# Patient Record
Sex: FEMALE | Race: BLACK OR AFRICAN AMERICAN | NOT HISPANIC OR LATINO | URBAN - METROPOLITAN AREA
[De-identification: names, ages, dates, MRNs, and addresses within clinical notes are randomized per-mention and may not be internally consistent; named-entity substitution may affect disease eponyms.]

---

## 2017-12-22 ENCOUNTER — OUTPATIENT (OUTPATIENT)
Dept: OUTPATIENT SERVICES | Facility: HOSPITAL | Age: 67
LOS: 1 days | Discharge: HOME | End: 2017-12-22

## 2017-12-22 DIAGNOSIS — Z02.9 ENCOUNTER FOR ADMINISTRATIVE EXAMINATIONS, UNSPECIFIED: ICD-10-CM

## 2019-05-15 ENCOUNTER — OUTPATIENT (OUTPATIENT)
Dept: OUTPATIENT SERVICES | Facility: HOSPITAL | Age: 69
LOS: 1 days | Discharge: HOME | End: 2019-05-15

## 2019-05-15 DIAGNOSIS — E04.9 NONTOXIC GOITER, UNSPECIFIED: ICD-10-CM

## 2019-10-18 PROBLEM — Z00.00 ENCOUNTER FOR PREVENTIVE HEALTH EXAMINATION: Status: ACTIVE | Noted: 2019-10-18

## 2019-10-31 ENCOUNTER — APPOINTMENT (OUTPATIENT)
Dept: SURGERY | Facility: CLINIC | Age: 69
End: 2019-10-31
Payer: MEDICARE

## 2019-10-31 VITALS
HEIGHT: 67 IN | BODY MASS INDEX: 28.72 KG/M2 | WEIGHT: 183 LBS | DIASTOLIC BLOOD PRESSURE: 78 MMHG | SYSTOLIC BLOOD PRESSURE: 126 MMHG

## 2019-10-31 DIAGNOSIS — L72.0 EPIDERMAL CYST: ICD-10-CM

## 2019-10-31 DIAGNOSIS — Z78.9 OTHER SPECIFIED HEALTH STATUS: ICD-10-CM

## 2019-10-31 DIAGNOSIS — Z80.3 FAMILY HISTORY OF MALIGNANT NEOPLASM OF BREAST: ICD-10-CM

## 2019-10-31 DIAGNOSIS — K57.30 DIVERTICULOSIS OF LARGE INTESTINE W/OUT PERFORATION OR ABSCESS W/OUT BLEEDING: ICD-10-CM

## 2019-10-31 DIAGNOSIS — J32.9 CHRONIC SINUSITIS, UNSPECIFIED: ICD-10-CM

## 2019-10-31 DIAGNOSIS — S46.009A UNSPECIFIED INJURY OF MUSCLE(S) AND TENDON(S) OF THE ROTATOR CUFF OF UNSPECIFIED SHOULDER, INITIAL ENCOUNTER: ICD-10-CM

## 2019-10-31 DIAGNOSIS — J45.909 UNSPECIFIED ASTHMA, UNCOMPLICATED: ICD-10-CM

## 2019-10-31 DIAGNOSIS — Z82.49 FAMILY HISTORY OF ISCHEMIC HEART DISEASE AND OTHER DISEASES OF THE CIRCULATORY SYSTEM: ICD-10-CM

## 2019-10-31 DIAGNOSIS — Z82.3 FAMILY HISTORY OF STROKE: ICD-10-CM

## 2019-10-31 PROCEDURE — 99202 OFFICE O/P NEW SF 15 MIN: CPT

## 2019-11-01 PROBLEM — S46.009A ROTATOR CUFF INJURY: Status: ACTIVE | Noted: 2019-10-31

## 2019-11-01 PROBLEM — J32.9 CHRONIC SINUSITIS: Status: ACTIVE | Noted: 2019-10-31

## 2019-11-01 PROBLEM — Z80.3 FAMILY HISTORY OF MALIGNANT NEOPLASM OF BREAST: Status: ACTIVE | Noted: 2019-10-31

## 2019-11-01 PROBLEM — L72.0 EPIDERMAL INCLUSION CYST: Status: ACTIVE | Noted: 2019-11-01

## 2019-11-01 PROBLEM — Z82.3 FAMILY HISTORY OF CEREBROVASCULAR ACCIDENT (CVA): Status: ACTIVE | Noted: 2019-10-31

## 2019-11-01 PROBLEM — Z82.49 FAMILY HISTORY OF MYOCARDIAL INFARCTION: Status: ACTIVE | Noted: 2019-10-31

## 2019-11-01 PROBLEM — Z78.9 CAFFEINE USE: Status: ACTIVE | Noted: 2019-10-31

## 2019-11-01 PROBLEM — Z78.9 CONSUMES ALCOHOL OCCASIONALLY: Status: ACTIVE | Noted: 2019-10-31

## 2019-11-01 PROBLEM — K57.30 DIVERTICULOSIS, SIGMOID: Status: ACTIVE | Noted: 2019-10-31

## 2019-11-01 PROBLEM — J45.909 ASTHMA: Status: ACTIVE | Noted: 2019-10-31

## 2019-11-01 NOTE — ASSESSMENT
[FreeTextEntry1] : 3 subcutaneous soft tissue tumors of the right upper arm, consistent with lipomas. The 2 that are symptomatic will be excised for definitive diagnosis and the procedure was explained in full.  The patient and her daughter understand and agree and are ready to proceed as outlined. All their questions were answered.

## 2019-11-01 NOTE — REASON FOR VISIT
[Initial Evaluation] : an initial evaluation [Family Member] : family member [FreeTextEntry1] : Multiple masses right upper arm

## 2019-11-01 NOTE — PHYSICAL EXAM
[Normal Thyroid] : the thyroid was normal [Normal Breath Sounds] : Normal breath sounds [Normal Heart Sounds] : normal heart sounds [Normal Rate and Rhythm] : normal rate and rhythm [de-identified] : Noninflamed inclusion cysts less than 1 cm in size in the right parietal occipital region of the scalp [de-identified] : no adenopathy [de-identified] : 3 subcutaneous soft tissue masses of the right upper arm, 2 measuring 5 and 12 mm respectively located laterally just above the right elbow, which are tender to the touch but with no local acute changes. A third measures about 1 cm in the inner aspect of the right upper arm and is nontender.  No axillary adenopathy.

## 2020-05-01 ENCOUNTER — TRANSCRIPTION ENCOUNTER (OUTPATIENT)
Age: 70
End: 2020-05-01

## 2020-05-01 ENCOUNTER — EMERGENCY (EMERGENCY)
Facility: HOSPITAL | Age: 70
LOS: 0 days | Discharge: HOME | End: 2020-05-01
Attending: EMERGENCY MEDICINE | Admitting: EMERGENCY MEDICINE
Payer: MEDICARE

## 2020-05-01 VITALS
SYSTOLIC BLOOD PRESSURE: 172 MMHG | HEART RATE: 107 BPM | RESPIRATION RATE: 18 BRPM | DIASTOLIC BLOOD PRESSURE: 88 MMHG | OXYGEN SATURATION: 99 % | TEMPERATURE: 98 F

## 2020-05-01 DIAGNOSIS — M79.603 PAIN IN ARM, UNSPECIFIED: ICD-10-CM

## 2020-05-01 DIAGNOSIS — G72.9 MYOPATHY, UNSPECIFIED: ICD-10-CM

## 2020-05-01 LAB
ALBUMIN SERPL ELPH-MCNC: 5 G/DL — SIGNIFICANT CHANGE UP (ref 3.5–5.2)
ALP SERPL-CCNC: 128 U/L — HIGH (ref 30–115)
ALT FLD-CCNC: 17 U/L — SIGNIFICANT CHANGE UP (ref 0–41)
ANION GAP SERPL CALC-SCNC: 13 MMOL/L — SIGNIFICANT CHANGE UP (ref 7–14)
APPEARANCE UR: CLEAR — SIGNIFICANT CHANGE UP
AST SERPL-CCNC: 26 U/L — SIGNIFICANT CHANGE UP (ref 0–41)
BILIRUB SERPL-MCNC: 0.7 MG/DL — SIGNIFICANT CHANGE UP (ref 0.2–1.2)
BILIRUB UR-MCNC: NEGATIVE — SIGNIFICANT CHANGE UP
BUN SERPL-MCNC: 21 MG/DL — HIGH (ref 10–20)
CALCIUM SERPL-MCNC: 10 MG/DL — SIGNIFICANT CHANGE UP (ref 8.5–10.1)
CHLORIDE SERPL-SCNC: 101 MMOL/L — SIGNIFICANT CHANGE UP (ref 98–110)
CK SERPL-CCNC: 171 U/L — SIGNIFICANT CHANGE UP (ref 0–225)
CO2 SERPL-SCNC: 27 MMOL/L — SIGNIFICANT CHANGE UP (ref 17–32)
COLOR SPEC: SIGNIFICANT CHANGE UP
CREAT SERPL-MCNC: 1.4 MG/DL — SIGNIFICANT CHANGE UP (ref 0.7–1.5)
DIFF PNL FLD: NEGATIVE — SIGNIFICANT CHANGE UP
GLUCOSE SERPL-MCNC: 97 MG/DL — SIGNIFICANT CHANGE UP (ref 70–99)
GLUCOSE UR QL: NEGATIVE — SIGNIFICANT CHANGE UP
HCT VFR BLD CALC: 43.2 % — SIGNIFICANT CHANGE UP (ref 37–47)
HGB BLD-MCNC: 13.9 G/DL — SIGNIFICANT CHANGE UP (ref 12–16)
KETONES UR-MCNC: NEGATIVE — SIGNIFICANT CHANGE UP
LEUKOCYTE ESTERASE UR-ACNC: NEGATIVE — SIGNIFICANT CHANGE UP
MCHC RBC-ENTMCNC: 26.3 PG — LOW (ref 27–31)
MCHC RBC-ENTMCNC: 32.2 G/DL — SIGNIFICANT CHANGE UP (ref 32–37)
MCV RBC AUTO: 81.8 FL — SIGNIFICANT CHANGE UP (ref 81–99)
NITRITE UR-MCNC: NEGATIVE — SIGNIFICANT CHANGE UP
NRBC # BLD: 0 /100 WBCS — SIGNIFICANT CHANGE UP (ref 0–0)
PH UR: 6.5 — SIGNIFICANT CHANGE UP (ref 5–8)
PLATELET # BLD AUTO: 227 K/UL — SIGNIFICANT CHANGE UP (ref 130–400)
POTASSIUM SERPL-MCNC: 4.4 MMOL/L — SIGNIFICANT CHANGE UP (ref 3.5–5)
POTASSIUM SERPL-SCNC: 4.4 MMOL/L — SIGNIFICANT CHANGE UP (ref 3.5–5)
PROT SERPL-MCNC: 7.8 G/DL — SIGNIFICANT CHANGE UP (ref 6–8)
PROT UR-MCNC: NEGATIVE — SIGNIFICANT CHANGE UP
RBC # BLD: 5.28 M/UL — SIGNIFICANT CHANGE UP (ref 4.2–5.4)
RBC # FLD: 14.2 % — SIGNIFICANT CHANGE UP (ref 11.5–14.5)
SODIUM SERPL-SCNC: 141 MMOL/L — SIGNIFICANT CHANGE UP (ref 135–146)
SP GR SPEC: 1.01 — LOW (ref 1.01–1.02)
TROPONIN T SERPL-MCNC: <0.01 NG/ML — SIGNIFICANT CHANGE UP
UROBILINOGEN FLD QL: SIGNIFICANT CHANGE UP
WBC # BLD: 5.45 K/UL — SIGNIFICANT CHANGE UP (ref 4.8–10.8)
WBC # FLD AUTO: 5.45 K/UL — SIGNIFICANT CHANGE UP (ref 4.8–10.8)

## 2020-05-01 PROCEDURE — 71045 X-RAY EXAM CHEST 1 VIEW: CPT | Mod: 26

## 2020-05-01 PROCEDURE — 93010 ELECTROCARDIOGRAM REPORT: CPT

## 2020-05-01 PROCEDURE — 99285 EMERGENCY DEPT VISIT HI MDM: CPT

## 2020-05-01 NOTE — ED PROVIDER NOTE - CARE PROVIDERS DIRECT ADDRESSES
,[email or web address] [address].\[facility] [place] [facility]\""irect.[facility]

## 2020-05-01 NOTE — ED ADULT NURSE NOTE - OBJECTIVE STATEMENT
pt is A&Ox3, ambulatory, able to make needs known and presents with C/O right arm pain that was sudden and sharp at home. Pt went to [facility] and was then sent to ED for abnormal EKG and HTN. Pt states she started taking Lipitor and takes water pills and is unsure if they are related to her symptoms.

## 2020-05-01 NOTE — ED PROVIDER NOTE - NSFOLLOWUPINSTRUCTIONS_ED_ALL_ED_FT
Myopathy  Myopathy is a condition that causes muscles to be weak and not work well. There are many different kinds of myopathies. Myopathies may be passed from parent to child (inherited), or they may be caused by external factors (acquired).  Inherited myopathies may cause symptoms at birth or early in life. Acquired myopathies may start suddenly at any age. There is no cure for most myopathies.  What are the causes?  Common causes of myopathy include:  Endocrine disorders, such as thyroid disease.Metabolic disorders, which are usually inherited.Infection or inflammation of the muscles. This is often triggered by viruses or because the body's defense system (immune system) is attacking the muscles.Certain medicines, such as lipid-lowering medicines.In some cases, the cause is not known.  What increases the risk?  You are more likely to develop this condition if you:  Have a family history of myopathy.Are female.What are the signs or symptoms?  Symptoms of myopathy can range from mild to severe. Common symptoms of this condition include:  Muscle weakness.Cramps.Stiffness.Spasms.These symptoms are usually felt close to the center of the body (proximal). Depending upon the type of myopathy, one muscle group may be more affected than others. In inherited myopathies, symptoms vary among family members.  Other symptoms of myopathy include:  Muscle pain or tenderness.Muscle weakness that gets progressively worse.Fatigue.Heart problems.Trouble breathing.Trouble swallowing.How is this diagnosed?  This condition is diagnosed based on your medical history and tests, which may include:  Blood tests.Removal of a small piece of muscle tissue to be tested (biopsy).Electromyogram (EMG).MRI.Electrocardiogram (ECG).Genetic testing.How is this treated?  Treatment for this condition depends on the type of myopathy. Treatment may include:  Over-the-counter medicines such as acetaminophen or ibuprofen.Prescription medicines, such as disease-modifying antirheumatic drugs (DMARDs) or drugs that suppress the immune system.Physical therapy.A brace to help stabilize your muscles.Follow these instructions at home:  If you have a brace:     Wear the brace as told by your health care provider. Remove it only as told by your health care provider.Loosen the brace if any part of your body tingles, becomes numb, or turns cold and blue.Keep the brace clean.Check the skin around it every day. Tell your health care provider about any concerns.If the brace is not waterproof:  Do not let it get wet.Cover it with a watertight covering when you take a bath or shower.Ask your health care provider when it is safe to drive if you are wearing a brace.General instructions     Take over-the-counter and prescription medicines only as told by your health care provider.Maintain a healthy weight. Follow instructions from your health care provider about eating, drinking, and physical activity.If physical therapy is prescribed, do exercises as told by your health care provider or physical therapist.Keep all follow-up visits as told by your health care provider. This is important.Contact a health care provider if you have:  Trouble managing your symptoms at home.A fever.Get help right away if you develop:  Breathing problems.Chest pain.Summary  Myopathy is a condition that causes muscles to be weak and not work well.There is no cure for most myopathies.This condition may be treated with medicines, physical therapy, and a brace to help stabilize your muscles.This information is not intended to replace advice given to you by your health care provider. Make sure you discuss any questions you have with your health care provider.

## 2020-05-01 NOTE — ED ADULT NURSE NOTE - CHIEF COMPLAINT QUOTE
C/o severe right arm pain. Sent in by [facility] for abnormal EKG. Pt received 3 baby aspirin at [facility].

## 2020-05-01 NOTE — ED PROVIDER NOTE - CLINICAL SUMMARY MEDICAL DECISION MAKING FREE TEXT BOX
70 yo woman with sudden onset of sharp and severe right arm pain at the bicep only associated with some numbness to right hand.  ? radiculopathy vs. myopathy.  Cardiac workup negative in addition, story does not sound cardiac in nature.  Stable for discharge and outpatient follow up.  Return for any new or worsening symptoms.

## 2020-05-01 NOTE — ED PROVIDER NOTE - CARE PROVIDER_API CALL
Sarkis Palmer)  Internal Medicine  [address] [place] [address]  Phone: [phone]  Fax: [phone]  Follow Up Time: 4-6 Days

## 2020-05-01 NOTE — ED PROVIDER NOTE - PROGRESS NOTE DETAILS
Pt in no distress, resting comfortably. Pending remaining labs. Discussed labs and imaging. Discussed need to follow up PMD Dr. Brown. Discussed signs and symptoms to return to the ED for. Pt understands and agrees with discharge plan

## 2020-05-01 NOTE — ED PROVIDER NOTE - ATTENDING CONTRIBUTION TO CARE
I personally evaluated the patient. I reviewed the Resident’s or Physician Assistant’s note (as assigned above), and agree with the findings and plan except as documented in my note.  68 yo woman with severe sudden right arm pain today that lasted about one minute and resolved.   Associated with some numbness to right hand.  EKG and labs ok.   CXRAY ok.  Story does not sound like cardiac ischemia.  Likely radicular or myopathy.  Patient very concerned about uncontrolled BP.  She will keep a log and follow up with PMD next week.  Stable for discharge.

## 2020-05-01 NOTE — ED PROVIDER NOTE - OBJECTIVE STATEMENT
68 yo F with hx of HTN, HDL, recently started on atorvastatin, presents for evaluation of right arm pain, onset today, resolved. Pt also reports that she has been having muscle pain/spasms since she started atorvastatin a week ago. No fever, no chill, no headache, no nausea, no vomiting, no chest pain, no back pain, no abdominal pain, no SOB. Pt states she went to the urgent care, was given 3 81mg ASA, and had an ekg that was "abnormal" and was recommended to come to the ED.   PMD: Stephanie 70 yo F with hx of HTN, HDL, recently started on atorvastatin, presents for evaluation of right arm pain, onset today, resolved. Pt also reports that she has been having muscle pain/spasms since she started atorvastatin a week ago. No fever, no chill, no headache, no nausea, no vomiting, no chest pain, no back pain, no abdominal pain, no SOB. Pt states she went to the urgent care, was given 3 81mg ASA, and had an ekg that was "abnormal" and was recommended to come to the ED.   PMD: Estela

## 2020-05-01 NOTE — ED ADULT TRIAGE NOTE - CHIEF COMPLAINT QUOTE
C/o severe right arm pain. [place] [facility] [place]. Pt received 3 baby aspirin at [facility].

## 2020-05-01 NOTE — ED PROVIDER NOTE - PATIENT PORTAL LINK FT
You can access the FollowMyHealth Patient Portal offered by [facility] by registering at the following website: [email or web address]. By joining [employer]’s FollowMyHealth portal, you will also be able to view your health information using other applications (apps) compatible with our system.

## 2020-06-15 ENCOUNTER — APPOINTMENT (OUTPATIENT)
Dept: OBGYN | Facility: CLINIC | Age: 70
End: 2020-06-15

## 2020-07-31 ENCOUNTER — APPOINTMENT (OUTPATIENT)
Dept: OBGYN | Facility: CLINIC | Age: 70
End: 2020-07-31
Payer: MEDICARE

## 2020-07-31 VITALS
HEART RATE: 85 BPM | TEMPERATURE: 98.7 F | BODY MASS INDEX: 27 KG/M2 | HEIGHT: 67 IN | SYSTOLIC BLOOD PRESSURE: 150 MMHG | DIASTOLIC BLOOD PRESSURE: 100 MMHG | WEIGHT: 172 LBS

## 2020-07-31 DIAGNOSIS — Z83.3 FAMILY HISTORY OF DIABETES MELLITUS: ICD-10-CM

## 2020-07-31 DIAGNOSIS — Z87.09 PERSONAL HISTORY OF OTHER DISEASES OF THE RESPIRATORY SYSTEM: ICD-10-CM

## 2020-07-31 DIAGNOSIS — Z86.69 PERSONAL HISTORY OF OTHER DISEASES OF THE NERVOUS SYSTEM AND SENSE ORGANS: ICD-10-CM

## 2020-07-31 DIAGNOSIS — K57.90 DIVERTICULOSIS OF INTESTINE, PART UNSPECIFIED, W/OUT PERFORATION OR ABSCESS W/OUT BLEEDING: ICD-10-CM

## 2020-07-31 DIAGNOSIS — Z63.5 DISRUPTION OF FAMILY BY SEPARATION AND DIVORCE: ICD-10-CM

## 2020-07-31 DIAGNOSIS — Z80.41 FAMILY HISTORY OF MALIGNANT NEOPLASM OF OVARY: ICD-10-CM

## 2020-07-31 DIAGNOSIS — I73.00 RAYNAUD'S SYNDROME W/OUT GANGRENE: ICD-10-CM

## 2020-07-31 DIAGNOSIS — Z86.19 PERSONAL HISTORY OF OTHER INFECTIOUS AND PARASITIC DISEASES: ICD-10-CM

## 2020-07-31 DIAGNOSIS — M72.2 PLANTAR FASCIAL FIBROMATOSIS: ICD-10-CM

## 2020-07-31 LAB
BILIRUB UR QL STRIP: NORMAL
CLARITY UR: CLEAR
COLLECTION METHOD: NORMAL
GLUCOSE UR-MCNC: NORMAL
HCG UR QL: 0.2 EU/DL
HGB UR QL STRIP.AUTO: ABNORMAL
KETONES UR-MCNC: NORMAL
LEUKOCYTE ESTERASE UR QL STRIP: NORMAL
NITRITE UR QL STRIP: NORMAL
PH UR STRIP: 7
PROT UR STRIP-MCNC: NORMAL
SP GR UR STRIP: 1.02

## 2020-07-31 PROCEDURE — 99215 OFFICE O/P EST HI 40 MIN: CPT

## 2020-07-31 PROCEDURE — 81003 URINALYSIS AUTO W/O SCOPE: CPT | Mod: QW

## 2020-07-31 PROCEDURE — 99387 INIT PM E/M NEW PAT 65+ YRS: CPT

## 2020-07-31 SDOH — SOCIAL STABILITY - SOCIAL INSECURITY: DISRUPTION OF FAMILY BY SEPARATION AND DIVORCE: Z63.5

## 2020-08-04 PROBLEM — Z80.41 FAMILY HISTORY OF MALIGNANT NEOPLASM OF OVARY: Status: ACTIVE | Noted: 2020-08-04

## 2020-08-04 PROBLEM — K57.90 DIVERTICULOSIS: Status: RESOLVED | Noted: 2020-08-04 | Resolved: 2020-08-04

## 2020-08-04 PROBLEM — Z63.5 DIVORCED: Status: ACTIVE | Noted: 2020-07-31

## 2020-08-04 PROBLEM — Z83.3 FAMILY HISTORY OF DIABETES MELLITUS: Status: ACTIVE | Noted: 2020-08-04

## 2020-08-04 PROBLEM — Z86.19 HISTORY OF HEPATITIS B VIRUS INFECTION: Status: RESOLVED | Noted: 2020-08-04 | Resolved: 2020-08-04

## 2020-08-04 PROBLEM — I73.00 RAYNAUD PHENOMENON: Status: RESOLVED | Noted: 2020-08-04 | Resolved: 2020-08-04

## 2020-08-04 PROBLEM — Z87.09 HISTORY OF ASTHMA: Status: RESOLVED | Noted: 2020-08-04 | Resolved: 2020-08-04

## 2020-08-04 PROBLEM — Z86.69 HISTORY OF SCIATICA: Status: RESOLVED | Noted: 2020-08-04 | Resolved: 2020-08-04

## 2020-08-04 PROBLEM — M72.2 PLANTAR FASCIITIS: Status: ACTIVE | Noted: 2019-10-31

## 2020-08-04 LAB — HPV HIGH+LOW RISK DNA PNL CVX: NOT DETECTED

## 2020-08-04 RX ORDER — VALSARTAN 160 MG/1
160 TABLET, COATED ORAL
Refills: 0 | Status: COMPLETED | COMMUNITY
End: 2020-08-04

## 2020-08-04 RX ORDER — TRIAMTERENE AND HYDROCHLOROTHIAZIDE 25; 37.5 MG/1; MG/1
37.5-25 TABLET ORAL
Refills: 0 | Status: COMPLETED | COMMUNITY
End: 2020-08-04

## 2020-08-04 NOTE — CHIEF COMPLAINT
[Initial Visit] : initial GYN visit [FreeTextEntry1] : Pt is here for her initial annual exam. Overall pt feels good, denies any pelvic pain or abnormal bleeding. Pt c/o had small lump on labia that comes and goes every month. \par \par Mammo- 2019 abnormal cyst was aspirated and was benign. \par 2020- results pending.

## 2020-08-04 NOTE — HISTORY OF PRESENT ILLNESS
[Definite:  ___ (Date)] : the last menstrual period was [unfilled] [Menarche Age: ____] : age at menarche was [unfilled] [Menopause Age: ____] : age at menopause was [unfilled] [No] : no [Currently In Menopause] : currently in menopause [Experiencing Menopausal Sxs] : not experiencing menopausal symptoms [Sexually Active] : is not sexually active [Contraception] : does not use contraception

## 2020-08-04 NOTE — COUNSELING
[Nutrition] : nutrition [Exercise] : exercise [Breast Self Exam] : breast self exam [Vitamins/Supplements] : vitamins/supplements [Safe Sexual Practices] : safe sexual practices

## 2020-08-04 NOTE — PHYSICAL EXAM
[Awake] : awake [Alert] : alert [Acute Distress] : no acute distress [Mass] : no breast mass [Soft] : soft [Axillary LAD] : no axillary lymphadenopathy [Nipple Discharge] : no nipple discharge [Distended] : not distended [Oriented x3] : oriented to person, place, and time [Tender] : non tender [Depressed Mood] : not depressed [No Lesions] : no genitalia lesions [Normal] : vagina [Labia Majora] : labia major [Labia Minora] : labia minora [Atrophy] : atrophy [Pink Rugae] : pink rugae [No Bleeding] : there was no active vaginal bleeding [Discharge] : no discharge [Polyp ___ cm] : had a [unfilled] ~Ucm polyp [Motion Tenderness] : there was no cervical motion tenderness [Pap Obtained] : a Pap smear was performed [Uterine Adnexae] : were not tender and not enlarged [Enlarged ___ wks] : not enlarged [Tenderness] : nontender [Mass ___ cm] : no uterine mass was palpated [FreeTextEntry5] : cervical polyp? lesion noted within the cervical canal and minimal protrusion, difficult to visualize if coming from cervix or uterus, can clear margin circumferentially all areas of visualization [de-identified] : area and lesion patient c/o intermittent is not visible or palpable on today's exam, encouraged to come when present for evaluation [FreeTextEntry4] : left vaginal wall cyst, 1cm, near fornix, NT

## 2020-08-13 ENCOUNTER — EMERGENCY (EMERGENCY)
Facility: HOSPITAL | Age: 70
LOS: 0 days | Discharge: HOME | End: 2020-08-13
Attending: EMERGENCY MEDICINE | Admitting: EMERGENCY MEDICINE
Payer: MEDICARE

## 2020-08-13 VITALS
DIASTOLIC BLOOD PRESSURE: 95 MMHG | SYSTOLIC BLOOD PRESSURE: 181 MMHG | TEMPERATURE: 98 F | WEIGHT: 177.91 LBS | RESPIRATION RATE: 18 BRPM | HEART RATE: 97 BPM | OXYGEN SATURATION: 100 %

## 2020-08-13 DIAGNOSIS — R60.0 LOCALIZED EDEMA: ICD-10-CM

## 2020-08-13 DIAGNOSIS — R35.0 FREQUENCY OF MICTURITION: ICD-10-CM

## 2020-08-13 DIAGNOSIS — Z88.2 ALLERGY STATUS TO SULFONAMIDES: ICD-10-CM

## 2020-08-13 DIAGNOSIS — I10 ESSENTIAL (PRIMARY) HYPERTENSION: ICD-10-CM

## 2020-08-13 DIAGNOSIS — E78.5 HYPERLIPIDEMIA, UNSPECIFIED: ICD-10-CM

## 2020-08-13 DIAGNOSIS — Z88.0 ALLERGY STATUS TO PENICILLIN: ICD-10-CM

## 2020-08-13 DIAGNOSIS — Z88.8 ALLERGY STATUS TO OTHER DRUGS, MEDICAMENTS AND BIOLOGICAL SUBSTANCES STATUS: ICD-10-CM

## 2020-08-13 LAB
APPEARANCE UR: CLEAR — SIGNIFICANT CHANGE UP
BILIRUB UR-MCNC: NEGATIVE — SIGNIFICANT CHANGE UP
COLOR SPEC: COLORLESS — SIGNIFICANT CHANGE UP
DIFF PNL FLD: NEGATIVE — SIGNIFICANT CHANGE UP
GLUCOSE UR QL: NEGATIVE — SIGNIFICANT CHANGE UP
KETONES UR-MCNC: NEGATIVE — SIGNIFICANT CHANGE UP
LEUKOCYTE ESTERASE UR-ACNC: NEGATIVE — SIGNIFICANT CHANGE UP
NITRITE UR-MCNC: NEGATIVE — SIGNIFICANT CHANGE UP
PH UR: 7.5 — SIGNIFICANT CHANGE UP (ref 5–8)
PROT UR-MCNC: NEGATIVE — SIGNIFICANT CHANGE UP
SP GR SPEC: 1.01 — LOW (ref 1.01–1.02)
UROBILINOGEN FLD QL: SIGNIFICANT CHANGE UP

## 2020-08-13 PROCEDURE — 99283 EMERGENCY DEPT VISIT LOW MDM: CPT

## 2020-08-13 NOTE — ED ADULT NURSE NOTE - OBJECTIVE STATEMENT
Patient present to ED with complains of b/l lower ext swelling x 10 days, started on a new BP medication. Denies fevers, chills, nausea, vomiting, diarrhea, lip swelling, and SOB.

## 2020-08-13 NOTE — ED PROVIDER NOTE - CARE PROVIDER_API CALL
Tapan Licea  [address] [address]  Phone: [phone]  Fax: [phone]  Follow Up Time: 1-3 Days

## 2020-08-13 NOTE — ED PROVIDER NOTE - PATIENT PORTAL LINK FT
You can access the FollowMyHealth Patient Portal offered by [facility] by registering at the following website: [email or web address]. By joining [employer]’s FollowMyHealth portal, you will also be able to view your health information using other applications (apps) compatible with our system.

## 2020-08-13 NOTE — ED PROVIDER NOTE - PHYSICAL EXAMINATION
Physical Exam    Vital Signs: I have reviewed the initial vital signs.  Constitutional: well-nourished, appears stated age, no acute distress  Cardiovascular: S1 and S2, regular rate, regular rhythm, well-perfused extremities, radial and pedal pulses equal and 2+ b/l.   Respiratory: unlabored respiratory effort, clear to auscultation bilaterally no wheezing, rales and rhonchi. pt is speaking full sentences. no accessory muscle use.   Gastrointestinal: soft, non-tender, nondistended abdomen, no pulsatile mass, normal bowl sounds, no rebound, no guarding  Musculoskeletal: supple neck, mild b/l ankle edema, no calf tenderness, no midline tenderness, no palpable spinal step offs. FROM of b/l upper and lower extremities.   Integumentary: warm, dry, no rash.   Neurologic: awake, alert  Psychiatric: appropriate mood, appropriate affect

## 2020-08-13 NOTE — ED PROVIDER NOTE - ATTENDING CONTRIBUTION TO CARE
68 y/o f w/ pmhx of htn and Raynaud's presents with b/l ankle nonpitting edema for two days. pt reports she stands on her legs all day and has been doing so more often lately. pt also reports she was switched from losartan to irbesartan ~ 12 days ago and has been taking it for bp control, today called her pmd and the nurse there told her to come to ed for concern for allergic reaction. pt also requested to check urine because she had an ultrasound on Monday ~ 4 days ago for a polyp and since then noticed increased frequency. No fever, chills, n/v, cp,  pleuritic cp, sob, palpitations, diaphoresis, cough, sore throat, drooling/secretions, hives, pruritis, ha/lh/dizziness, numbness/tingling, neck pain/ stiffness, abd pain, diarrhea, constipation, melena/brbpr, hematuria, burning upon urination, trauma, weakness, edema, calf pain/swelling/erythema, sick contacts, recent travel or rash.    Vital Signs: I have reviewed the initial vital signs. Constitutional: WDWN in nad. Sitting on stretcher speaking full sentences. Integumentary: No rash. ENT: MMM NECK: Supple, non-tender, no meningeal signs. Cardiovascular: RRR, radial pulses 2/4 b/l. No JVD. Respiratory: BS present b/l, ctabl, no wheezing or crackles, no accessory muscle use, no stridor. Gastrointestinal: BS present throughout all 4 quadrants, soft, nd, nt, no rebound tenderness or guarding, no cvat. Musculoskeletal: FROM, b/l ankle non-pitting edema swelling, mild, no calf pain/swelling/erythema. Neurologic: AAOx3, motor 5/5 and sensation intact throughout upper and lowe ext, CN II-XII intact, No facial droop or slurring of speech. No focal deficits.

## 2020-08-13 NOTE — ED PROVIDER NOTE - CLINICAL SUMMARY MEDICAL DECISION MAKING FREE TEXT BOX
pt resting comfortably, no complaints at this time aware of compressions, stockings, elevation of foot, following up with pmd for medication adjustment if needed, no calf pain or swelling, strictly to ankles and minimal, no focal deficits, pt ambulatory in ed, neurovascular intact, urine also negative based on ua, urine culture sent, aware of signs and symptoms to return for, will follow up.

## 2020-08-13 NOTE — ED PROVIDER NOTE - NS ED ROS FT
CONST: No fever, chills or bodyaches  EYES: No pain, redness, drainage or visual changes.  ENT: No ear pain or discharge, nasal discharge or congestion. No sore throat  CARD: No chest pain, palpitations  RESP: No SOB, cough, hemoptysis. No hx of asthma or COPD  GI: No abdominal pain, N/V/D  : No urinary symptoms  MS: No joint pain, back pain or extremity pain/injury  SKIN: (+) ankle swelling. No rashes  NEURO: No headache, dizziness, paresthesias or LOC

## 2020-08-13 NOTE — ED PROVIDER NOTE - PLAN OF CARE
Plan: UA, discussed compression stockings, pt reports will see pmd tomorrow as well to discuss medication she is on. will reassess.

## 2020-08-13 NOTE — ED PROVIDER NOTE - CARE PLAN
Assessment and plan of treatment:	Plan: UA, discussed compression stockings, pt reports will see pmd tomorrow as well to discuss medication she is on. will reassess. Principal Discharge DX:	Ankle swelling  Assessment and plan of treatment:	Plan: UA, discussed compression stockings, pt reports will see pmd tomorrow as well to discuss medication she is on. will reassess.  Secondary Diagnosis:	Urinary frequency

## 2020-08-13 NOTE — ED ADULT TRIAGE NOTE - CHIEF COMPLAINT QUOTE
pt sent to ED by PMD for swelling to B/L hands and feet. PMD thinks it make be an allergic reaction to a new anti-hypertensive medication that she started 12 days ago. denies itching, denies difficulty breathing, denies N/V

## 2020-08-13 NOTE — ED PROVIDER NOTE - PROGRESS NOTE DETAILS
discussed urine negative for uti. discussed with pt f/u with pcp if swelling continues. ice and elevate legs, use compression stockings. advised pt to f/u with vascular as well. pt advised of return precaution such as worsening swelling, sob, redness, or inability to ambulate.

## 2020-08-13 NOTE — ED PROVIDER NOTE - OBJECTIVE STATEMENT
68 y/o female with a PMH of HTN, and HLD presents to the ED for evaluation of b/l ankle swelling x 1 week. Pt used to take valsartan for her bp; but complications with amount she was being given from the pharmacy so pt PCP Dr. Sarmiento changed her medication to ibersartan 13 days ago. pt reports she spoke to medicaid nurse about the swelling who believed she was having an allergic rx to medication, and called her pcp who advised she visit the ED. Pt reports she is 70 y/o female with a PMH of HTN, and HLD presents to the ED for evaluation of b/l ankle swelling x 1 week. Pt used to take valsartan for her bp; but complications with amount she was being given from the pharmacy so pt PCP Dr. Sarmiento changed her medication to ibersartan 13 days ago. pt reports she spoke to medicaid nurse about the swelling who believed she was having an allergic rx to medication, and called her pcp who advised she visit the ED. Pt reports she is a  and on her feet most of the day. Pt denies sob, sensation of throat closing, rashes or hives on the body, itching, fever, chills, chest pain, dizziness, n/v/d/c, abdominal pain, back pain, neck pain, dizziness, hx of blood clot, recent surgery, recent trauma, recent hospitalizations, use of hormones or steroids, hx of cancer, or leg pain.

## 2020-08-13 NOTE — ED PROVIDER NOTE - NSFOLLOWUPINSTRUCTIONS_ED_ALL_ED_FT
Urinary Frequency, Adult    Urinary frequency means urinating more often than usual. People with urinary frequency urinate at least 8 times in 24 hours, even if they drink a normal amount of fluid. Although they urinate more often than normal, the total amount of urine produced in a day may be normal. Urinary frequency is also called pollakiuria.    What are the causes?  This condition may be caused by:  A urinary tract infection.  Obesity.  Bladder problems, such as bladder stones.  Caffeine or alcohol.  Eating food or drinking fluids that irritate the bladder. These include coffee, tea, soda, artificial sweeteners, citrus, tomato-based foods, and chocolate.  Certain medicines, such as medicines that help the body get rid of extra fluid (diuretics).  Muscle or nerve weakness.  Overactive bladder.  Chronic diabetes.  Interstitial cystitis.  In men, problems with the prostate, such as an enlarged prostate.  In women, pregnancy.  In some cases, the cause may not be known.    What increases the risk?  This condition is more likely to develop in:  Women who have gone through menopause.  Men with prostate problems.  People with a disease or injury that affects the nerves or spinal cord.  People who have or have had a condition that affects the brain, such as a stroke.  What are the signs or symptoms?  Symptoms of this condition include:  Feeling an urgent need to urinate often. The stress and anxiety of needing to find a bathroom quickly can make this urge worse.  Urinating 8 or more times in 24 hours.  Urinating as often as every 1 to 2 hours.  How is this diagnosed?  This condition is diagnosed based on your symptoms, your medical history, and a physical exam. You may have tests, such as:  Blood tests.  Urine tests.  Imaging tests, such as X-rays or ultrasounds.  A bladder test.  A test of your neurological system. This is the body system that senses the need to urinate.  A test to check for problems in the urethra and bladder called cystoscopy.  You may also be asked to keep a bladder diary. A bladder diary is a record of what you eat and drink, how often you urinate, and how much you urinate. You may need to see a health care provider who specializes in conditions of the urinary tract (urologist) or kidneys (nephrologist).    How is this treated?  Treatment for this condition depends on the cause. Sometimes the condition goes away on its own and treatment is not necessary. If treatment is needed, it may include:  Taking medicine.  Learning exercises that strengthen the muscles that help control urination.  Following a bladder training program. This may include:  Learning to delay going to the bathroom.  Double urinating (voiding). This helps if you are not completely emptying your bladder.  Scheduled voiding.  Making diet changes, such as:  Avoiding caffeine.  Drinking fewer fluids, especially alcohol.  Not drinking in the evening.  Not having foods or drinks that may irritate the bladder.  Eating foods that help prevent or ease constipation. Constipation can make this condition worse.  Having the nerves in your bladder stimulated. There are two options for stimulating the nerves to your bladder:  Outpatient electrical nerve stimulation. This is done by your health care provider.  Surgery to implant a bladder pacemaker. The pacemaker helps to control the urge to urinate.  Follow these instructions at home:  Keep a bladder diary if told to by your health care provider.  Take over-the-counter and prescription medicines only as told by your health care provider.  Do any exercises as told by your health care provider.  Follow a bladder training program as told by your health care provider.  Make any recommended diet changes.  Keep all follow-up visits as told by your health care provider. This is important.  Contact a health care provider if:  You start urinating more often.  You feel pain or irritation when you urinate.  You notice blood in your urine.  Your urine looks cloudy.  You develop a fever.  You begin vomiting.  Get help right away if:  You are unable to urinate.  This information is not intended to replace advice given to you by your health care provider. Make sure you discuss any questions you have with your health care provider.      Edema    WHAT YOU NEED TO KNOW:    Edema is swelling throughout your body. Edema is usually a sign that you are retaining fluid. The swelling may be caused by heart failure or kidney, thyroid, or liver disease. It may also be caused by medicines such as antidepressants, blood pressure medicines, or hormones. Sudden swelling around the lips or face may be a sign of a severe allergic reaction. Swelling of an arm or leg may be caused by blockage of your veins.     DISCHARGE INSTRUCTIONS:    Return to the emergency department if:     You have shortness of breath at rest, especially when you lie down.      You cough up pink, foamy sputum.       You have chest pain.      Your heartbeat is fast or uneven.     Contact your healthcare provider if:     The swollen area feels cold and is pale or blue in color.      The swollen area feels warm, painful, and is red in color.      You have increased swelling or swelling in other parts of your body.      You have questions or concerns about your condition or care.    Medicines:     Medicines help to get rid of extra body fluid.       Take your medicine as directed. Contact your healthcare provider if you think your medicine is not helping or if you have side effects. Tell him or her if you are allergic to any medicine. Keep a list of the medicines, vitamins, and herbs you take. Include the amounts, and when and why you take them. Bring the list or the pill bottles to follow-up visits. Carry your medicine list with you in case of an emergency.    Follow up with your healthcare provider as directed: Write down your questions so you remember to ask them during your visits.     Manage edema:     Elevate your arms or legs as directed. Raise them above the level of your heart as often as you can. This will help decrease swelling and pain. Prop them on pillows or blankets to keep them elevated comfortably.      Wear pressure stockings as directed. The stockings are tight and put pressure on your legs. This helps to keep fluid from collecting in your legs or ankles.       Limit your salt intake. Salt causes your body to hold water. Ask about any other changes to your diet.      Stay active. Do not stand or sit for long periods of time. Ask your healthcare provider about the best exercise plan for you.      Keep your skin moist using lotion, cream, or ointment. Ask your healthcare provider what to use and how often to use it.

## 2020-08-13 NOTE — ED ADULT NURSE NOTE - NSIMPLEMENTINTERV_GEN_ALL_ED
Implemented All Universal Safety Interventions:  [place] to call system. Call bell, personal items and telephone within reach. Instruct patient to call for assistance. Room bathroom lighting operational. Non-slip footwear when patient is off stretcher. Physically safe environment: no spills, clutter or unnecessary equipment. Stretcher in lowest position, wheels locked, appropriate side rails in place.

## 2020-08-14 LAB
CULTURE RESULTS: SIGNIFICANT CHANGE UP
SPECIMEN SOURCE: SIGNIFICANT CHANGE UP

## 2020-08-21 ENCOUNTER — LABORATORY RESULT (OUTPATIENT)
Age: 70
End: 2020-08-21

## 2020-08-21 ENCOUNTER — APPOINTMENT (OUTPATIENT)
Dept: OBGYN | Facility: CLINIC | Age: 70
End: 2020-08-21
Payer: MEDICARE

## 2020-08-21 VITALS
HEIGHT: 67 IN | TEMPERATURE: 97.2 F | BODY MASS INDEX: 28.25 KG/M2 | WEIGHT: 180 LBS | SYSTOLIC BLOOD PRESSURE: 140 MMHG | DIASTOLIC BLOOD PRESSURE: 93 MMHG | HEART RATE: 80 BPM

## 2020-08-21 DIAGNOSIS — Z13.71 ENCOUNTER FOR NONPROCREATIVE GENETIC COUNSELING: ICD-10-CM

## 2020-08-21 DIAGNOSIS — Z71.83 ENCOUNTER FOR NONPROCREATIVE GENETIC COUNSELING: ICD-10-CM

## 2020-08-21 PROCEDURE — 57500 BIOPSY OF CERVIX: CPT

## 2020-08-25 PROBLEM — Z71.83 ENCOUNTER FOR NONPROCREATIVE GENETIC COUNSELING AND TESTING: Status: ACTIVE | Noted: 2020-08-25

## 2020-08-25 NOTE — PROCEDURE
[Abnormal US] : abnormal ultrasound findings [Risks] : risks [Benefits] : benefits [Alternatives] : alternatives [Patient] : patient [Infection] : infection [Bleeding] : bleeding [Allergic Reaction] : allergic reaction [Uterine Perforation] : uterine perforation [Pain] : pain [CONSENT OBTAINED] : written consent was obtained prior to the procedure. [Betadine] : Betadine [None] : none [de-identified] : tylenol 1g [de-identified] : attempted to grasp with allis clamp but atrophic/friable and unable to grasp without significant trauma [de-identified] : cervical polyp grasped and removed with allis clamp, multiple pieces retrieved and sent to pathology.  Cervix internal os stenosed and  unable to safe dilate with patient discomfort after a few attempts [de-identified] : cervical polypectomy [de-identified] : tolerated well with discomfort, unable to tolerate additional attempts of cervical dilation

## 2020-10-22 ENCOUNTER — NON-APPOINTMENT (OUTPATIENT)
Age: 70
End: 2020-10-22

## 2020-11-20 ENCOUNTER — APPOINTMENT (OUTPATIENT)
Dept: OBGYN | Facility: CLINIC | Age: 70
End: 2020-11-20

## 2021-08-10 ENCOUNTER — EMERGENCY (EMERGENCY)
Facility: HOSPITAL | Age: 71
LOS: 0 days | Discharge: HOME | End: 2021-08-10
Attending: EMERGENCY MEDICINE | Admitting: EMERGENCY MEDICINE
Payer: MEDICARE

## 2021-08-10 VITALS
SYSTOLIC BLOOD PRESSURE: 136 MMHG | TEMPERATURE: 99 F | WEIGHT: 182.1 LBS | HEIGHT: 67 IN | RESPIRATION RATE: 17 BRPM | HEART RATE: 95 BPM | DIASTOLIC BLOOD PRESSURE: 80 MMHG | OXYGEN SATURATION: 100 %

## 2021-08-10 DIAGNOSIS — Z87.19 PERSONAL HISTORY OF OTHER DISEASES OF THE DIGESTIVE SYSTEM: ICD-10-CM
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
## 2021-08-10 DIAGNOSIS — Z88.2 ALLERGY STATUS TO SULFONAMIDES: ICD-10-CM

## 2021-08-10 DIAGNOSIS — Z87.09 PERSONAL HISTORY OF OTHER DISEASES OF THE RESPIRATORY SYSTEM: ICD-10-CM

## 2021-08-10 DIAGNOSIS — J45.909 UNSPECIFIED ASTHMA, UNCOMPLICATED: ICD-10-CM

## 2021-08-10 DIAGNOSIS — Z90.49 ACQUIRED ABSENCE OF OTHER SPECIFIED PARTS OF DIGESTIVE TRACT: ICD-10-CM

## 2021-08-10 DIAGNOSIS — Z86.69 PERSONAL HISTORY OF OTHER DISEASES OF THE NERVOUS SYSTEM AND SENSE ORGANS: ICD-10-CM

## 2021-08-10 DIAGNOSIS — Z88.8 ALLERGY STATUS TO OTHER DRUGS, MEDICAMENTS AND BIOLOGICAL SUBSTANCES: ICD-10-CM

## 2021-08-10 DIAGNOSIS — Z87.39 PERSONAL HISTORY OF OTHER DISEASES OF THE MUSCULOSKELETAL SYSTEM AND CONNECTIVE TISSUE: ICD-10-CM

## 2021-08-10 DIAGNOSIS — S60.945A UNSPECIFIED SUPERFICIAL INJURY OF LEFT RING FINGER, INITIAL ENCOUNTER: ICD-10-CM

## 2021-08-10 DIAGNOSIS — E78.00 PURE HYPERCHOLESTEROLEMIA, UNSPECIFIED: ICD-10-CM
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
## 2021-08-10 DIAGNOSIS — Z88.0 ALLERGY STATUS TO PENICILLIN: ICD-10-CM

## 2021-08-10 DIAGNOSIS — I10 ESSENTIAL (PRIMARY) HYPERTENSION: ICD-10-CM

## 2021-08-10 PROCEDURE — 73130 X-RAY EXAM OF HAND: CPT | Mod: 26,LT

## 2021-08-10 PROCEDURE — 99284 EMERGENCY DEPT VISIT MOD MDM: CPT | Mod: 25

## 2021-08-10 PROCEDURE — 29130 APPL FINGER SPLINT STATIC: CPT | Mod: LT

## 2021-08-10 RX ORDER — IBUPROFEN 200 MG
600 TABLET ORAL ONCE
Refills: 0 | Status: COMPLETED | OUTPATIENT
Start: 2021-08-10 | End: 2021-08-10

## 2021-08-10 RX ADMIN — Medication 600 MILLIGRAM(S): at 20:42

## 2021-08-10 NOTE — ED PROVIDER NOTE - NSFOLLOWUPINSTRUCTIONS_ED_ALL_ED_FT
Please follow up with your primary care physician within 24-72 hours and return immediately if symptoms worsen.    Finger Fracture    WHAT YOU NEED TO KNOW:    A finger fracture is a break in 1 or more of the bones in your finger.     DISCHARGE INSTRUCTIONS:    Return to the emergency department if:     Your cast or splint gets wet, damaged, or comes off.      Your splint or cast feels too tight.      You have severe pain.      Your injured finger is numb, cold, or pale.    Contact your healthcare provider or hand specialist if:     Your pain or swelling gets worse, even after treatment.      You have questions or concerns about your condition or care.    Medicines:     NSAIDs, such as ibuprofen, help decrease swelling, pain, and fever. This medicine is available with or without a doctor's order. NSAIDs can cause stomach bleeding or kidney problems in certain people. If you take blood thinner medicine, always ask your healthcare provider if NSAIDs are safe for you. Always read the medicine label and follow directions.      Acetaminophen decreases pain and fever. It is available without a doctor's order. Ask how much to take and how often to take it. Follow directions. Acetaminophen can cause liver damage if not taken correctly.      Prescription pain medicine may be given. Ask your healthcare provider how to take this medicine safely. Some prescription pain medicines contain acetaminophen. Do not take other medicines that contain acetaminophen without talking to your healthcare provider. Too much acetaminophen may cause liver damage. Prescription pain medicine may cause constipation. Ask your healthcare provider how to prevent or treat constipation.       Take your medicine as directed. Contact your healthcare provider if you think your medicine is not helping or if you have side effects. Tell him or her if you are allergic to any medicine. Keep a list of the medicines, vitamins, and herbs you take. Include the amounts, and when and why you take them. Bring the list or the pill bottles to follow-up visits. Carry your medicine list with you in case of an emergency.    Self-care:     Wear your splint as directed. Do not remove your splint until you follow up with your healthcare provider or hand specialist.       Apply ice on your finger for 15 to 20 minutes every hour or as directed. Use an ice pack, or put crushed ice in a plastic bag. Cover it with a towel before you apply it to your skin. Ice helps prevent tissue damage and decreases swelling and pain.      Elevate your finger above the level of your heart as often as you can. This will help decrease swelling and pain. Prop your hand on pillows or blankets to keep it elevated comfortably.       Go to physical therapy as directed. A physical therapist teaches you exercises to help improve movement and strength, and to decrease pain.     Follow up with your healthcare provider or hand specialist within 2 days: Write down your questions so you remember to ask them during your visits.        © Copyright [employer] 2019 All illustrations and images included in CareNotes are the copyrighted property of [employer] or [employer].

## 2021-08-10 NOTE — ED PROCEDURE NOTE - ATTENDING CONTRIBUTION TO CARE
I was present for and supervised the key/critical aspects of the procedures performed during the care of the patient.--finger splint
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
## 2021-08-10 NOTE — ED PROVIDER NOTE - NSFOLLOWUPCLINICS_GEN_ALL_ED_FT
[facility]  Hand  [address], [address]  [place] [address]  Phone: [phone]  Fax:   Follow Up Time: 1-3 Days

## 2021-08-10 NOTE — ED PROVIDER NOTE - PHYSICAL EXAMINATION
Gen: NAD, AOx3  Head: NCAT  HEENT: PERRL, oral mucosa moist, normal conjunctiva, oropharynx clear without exudate or erythema  Lung: CTAB, no respiratory distress, no wheezing, rales, rhonchi  CV: normal s1/s2, rrr, Normal perfusion, pulses 2+ throughout  MSK: no visible deformities, full range of motion in all 4 extremities, LUE: 2+ pulse, AROM, edema/TTP to 4th digit   Neuro: No focal neurologic deficits  Skin: No rash   Psych: normal affect

## 2021-08-10 NOTE — ED PROVIDER NOTE - NS ED ROS FT
Constitutional: (-) fever  Eyes/ENT: (-) visual changes   Cardiovascular: (-) chest pain, (-) syncope  Respiratory: (-) cough, (-) shortness of breath  Gastrointestinal: (-) vomiting, (-) diarrhea  Genitourinary: (-) dysuria, (-) hesitancy, (-) frequency   Musculoskeletal: (-) neck pain, (-) back pain, (+) finger pain  Integumentary: (-) rash, (-) edema  Neurological: (-) headache, (-) altered mental status  Allergic/Immunologic: (-) pruritus
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
## 2021-08-10 NOTE — ED PROVIDER NOTE - PATIENT PORTAL LINK FT
You can access the FollowMyHealth Patient Portal offered by [facility] by registering at the following website: [email or web address]. By joining [employer]’s FollowMyHealth portal, you will also be able to view your health information using other applications (apps) compatible with our system.
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
## 2021-08-10 NOTE — ED ADULT NURSE NOTE - NSIMPLEMENTINTERV_GEN_ALL_ED
Implemented All Universal Safety Interventions:  [place] to call system. Call bell, personal items and telephone within reach. Instruct patient to call for assistance. Room bathroom lighting operational. Non-slip footwear when patient is off stretcher. Physically safe environment: no spills, clutter or unnecessary equipment. Stretcher in lowest position, wheels locked, appropriate side rails in place.

## 2022-02-01 ENCOUNTER — APPOINTMENT (OUTPATIENT)
Dept: NEUROLOGY | Facility: CLINIC | Age: 72
End: 2022-02-01
Payer: MEDICARE

## 2022-02-01 VITALS
SYSTOLIC BLOOD PRESSURE: 172 MMHG | OXYGEN SATURATION: 99 % | HEART RATE: 93 BPM | HEIGHT: 67 IN | BODY MASS INDEX: 30.61 KG/M2 | DIASTOLIC BLOOD PRESSURE: 93 MMHG | WEIGHT: 195 LBS

## 2022-02-01 DIAGNOSIS — M54.30 SCIATICA, UNSPECIFIED SIDE: ICD-10-CM

## 2022-02-01 DIAGNOSIS — D21.11 BENIGN NEOPLASM OF CONNECTIVE AND OTHER SOFT TISSUE OF RIGHT UPPER LIMB, INCLUDING SHOULDER: ICD-10-CM

## 2022-02-01 PROCEDURE — 99203 OFFICE O/P NEW LOW 30 MIN: CPT

## 2022-02-01 NOTE — ASSESSMENT
[FreeTextEntry1] : GABINO SCHAEFER is a 71 year old woman with medical history of large pituitary mass diagnosed in 2002 and chronic lumbar and cervical spine radiculopathy is here to establish her care. She used to follow with Dr Valero and was getting follow up images with her as well L spine radiculopathy management. Denies any headache or vision changes. Last MRI pituitary is from 2017 but report is not available. I will repeat the imaging and will refer to endocrinologist for hormonal studies. For lumbar radiculopathy, I will renew PT, tizanidine to be taken as needed for spams and will gather all the work up and EMG reports from Dr Valero office. \par \par - MRI pituitary w/wo \par - Referral to endocrinologist \par - PT for lumbar spine radiculopathy and stenosis \par - Tizanidine 2 mg PRN \par - RTC in 6 months

## 2022-02-01 NOTE — REVIEW OF SYSTEMS
[Difficulty Walking] : difficulty walking [Arthralgias] : arthralgias [Joint Pain] : joint pain [Limb Pain] : limb pain [Skin Lesions] : skin lesion [Muscle Weakness] : muscle weakness [Negative] : Endocrine

## 2022-02-01 NOTE — PHYSICAL EXAM
[FreeTextEntry1] : Mental status: Awake, alert and oriented x3.  Recent and remote memory intact.  Naming, repetition and comprehension intact.  Attention/concentration intact.  No dysarthria, no aphasia.  Fund of knowledge appropriate.  \par Cranial nerves: Pupils equally round and reactive to light, visual fields full, no nystagmus, extraocular muscles intact, V1 through V3 intact bilaterally and symmetric, face symmetric, hearing intact to finger rub, palate elevation symmetric, tongue was midline. No visual field defect. \par Motor:  MRC grading 5/5 b/l UE/LE.   strength 5/5.  Normal tone and bulk.  No abnormal movements.  \par Sensation: Intact to light touch, proprioception, and pinprick. \par Coordination: No dysmetria on finger-to-nose and heel-to-shin.  No dysdiadokinesia.\par Reflexes: 2+ in bilateral UE and absent in LEs downgoing toes bilaterally. (-) Johnson.\par Gait: Narrow and steady. No ataxia.  Romberg negative\par \par
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
## 2022-02-02 LAB
ALBUMIN SERPL ELPH-MCNC: 4.9 G/DL
ALP BLD-CCNC: 150 U/L
ALT SERPL-CCNC: 16 U/L
ANION GAP SERPL CALC-SCNC: 12 MMOL/L
AST SERPL-CCNC: 21 U/L
BILIRUB SERPL-MCNC: 0.7 MG/DL
BUN SERPL-MCNC: 19 MG/DL
CALCIUM SERPL-MCNC: 10 MG/DL
CHLORIDE SERPL-SCNC: 108 MMOL/L
CO2 SERPL-SCNC: 26 MMOL/L
CREAT SERPL-MCNC: 0.9 MG/DL
GLUCOSE SERPL-MCNC: 88 MG/DL
POTASSIUM SERPL-SCNC: 4.7 MMOL/L
PROT SERPL-MCNC: 7.4 G/DL
SODIUM SERPL-SCNC: 146 MMOL/L
TSH SERPL-ACNC: 0.72 UIU/ML

## 2022-02-08 ENCOUNTER — APPOINTMENT (OUTPATIENT)
Dept: ENDOCRINOLOGY | Facility: CLINIC | Age: 72
End: 2022-02-08
Payer: MEDICARE

## 2022-02-08 VITALS
WEIGHT: 195 LBS | HEIGHT: 67 IN | HEART RATE: 90 BPM | OXYGEN SATURATION: 98 % | SYSTOLIC BLOOD PRESSURE: 142 MMHG | DIASTOLIC BLOOD PRESSURE: 88 MMHG | TEMPERATURE: 97.3 F | BODY MASS INDEX: 30.61 KG/M2

## 2022-02-08 DIAGNOSIS — E04.1 NONTOXIC SINGLE THYROID NODULE: ICD-10-CM

## 2022-02-08 DIAGNOSIS — D35.2 BENIGN NEOPLASM OF PITUITARY GLAND: ICD-10-CM

## 2022-02-08 PROCEDURE — 99205 OFFICE O/P NEW HI 60 MIN: CPT

## 2022-02-08 NOTE — ASSESSMENT
[FreeTextEntry1] : 71 year old female with  history of cervical and lumbar radiculopathy, asthma,  tumor of pituitary gland?, chronic sinusitis, hypertension, and thyroid nodules   who present for evaluation of pituitary macroadenoma \par \par #pituitary macroadenoma \par - will need hormone work up to screen for hyper/ hypo function as not done in the best \par - will follow on MRI of pituitary requested by neurology to assess size and morphology \par - reviewed with patient and daughter indications for surgery \par \par #MNG \par - will get record from Dr wong \par \par #hypertension \par - now controlled with amlodipine and as needed diuretics \par - given reported low K and after hormonal work up done , if BP uncontrolled need to r/o secondary causes and hyperaldosteronism \par

## 2022-02-08 NOTE — DATA REVIEWED
[FreeTextEntry1] : 2/2/22: TSH 0.72  glucose 88 Na 146 K 4.7 GFr 64 alk phos 150 A1c 5.3% \par \par \par reviewed old MRI results

## 2022-02-08 NOTE — PHYSICAL EXAM
[Alert] : alert [Well Nourished] : well nourished [No Acute Distress] : no acute distress [No Proptosis] : no proptosis [No Lid Lag] : no lid lag [Thyroid Not Enlarged] : the thyroid was not enlarged [No Respiratory Distress] : no respiratory distress [No Accessory Muscle Use] : no accessory muscle use [Clear to Auscultation] : lungs were clear to auscultation bilaterally [No Murmurs] : no murmurs [Regular Rhythm] : with a regular rhythm [No Edema] : no peripheral edema [Not Tender] : non-tender [Soft] : abdomen soft [No Stigmata of Cushings Syndrome] : no stigmata of Cushings Syndrome [Abdominal Striae] : no abdominal striae [Acanthosis Nigricans] : no acanthosis nigricans [No Tremors] : no tremors [Oriented x3] : oriented to person, place, and time [de-identified] : + nodules
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
## 2022-02-08 NOTE — REVIEW OF SYSTEMS
[Fatigue] : no fatigue [Recent Weight Gain (___ Lbs)] : recent weight gain: [unfilled] lbs [Visual Field Defect] : no visual field defect [Dysphagia] : no dysphagia [Neck Pain] : no neck pain [Dysphonia] : no dysphonia [Chest Pain] : no chest pain [Palpitations] : no palpitations [Fast Heart Rate] : heart rate is not fast [Lower Ext Edema] : no lower extremity edema [Shortness Of Breath] : no shortness of breath [Wheezing] : no wheezing [SOB on Exertion] : no shortness of breath on exertion [Nausea] : no nausea [Constipation] : no constipation [Vomiting] : no vomiting [Diarrhea] : no diarrhea [Joint Pain] : no joint pain [Muscle Weakness] : muscle weakness [Myalgia] : myalgia  [Headaches] : no headaches [Dizziness] : no dizziness [Tremors] : no tremors [Pain/Numbness of Digits] : no pain/numbness of digits [Cold Intolerance] : no cold intolerance [Heat Intolerance] : no heat intolerance [Easy Bruising] : no tendency for easy bruising [All other systems negative] : All other systems negative

## 2022-02-09 ENCOUNTER — LABORATORY RESULT (OUTPATIENT)
Age: 72
End: 2022-02-09

## 2022-02-11 LAB
ALBUMIN SERPL ELPH-MCNC: 4.5 G/DL
ALP BLD-CCNC: 146 U/L
ALT SERPL-CCNC: 17 U/L
ANION GAP SERPL CALC-SCNC: 14 MMOL/L
AST SERPL-CCNC: 21 U/L
BASOPHILS # BLD AUTO: 0.06 K/UL
BASOPHILS NFR BLD AUTO: 1.1 %
BILIRUB SERPL-MCNC: 0.6 MG/DL
BUN SERPL-MCNC: 13 MG/DL
CALCIUM SERPL-MCNC: 10.1 MG/DL
CHLORIDE SERPL-SCNC: 106 MMOL/L
CO2 SERPL-SCNC: 25 MMOL/L
CORTIS SERPL-MCNC: 10.1 UG/DL
CREAT SERPL-MCNC: 0.8 MG/DL
EOSINOPHIL # BLD AUTO: 0.12 K/UL
EOSINOPHIL NFR BLD AUTO: 2.2 %
FSH SERPL-MCNC: 4.3 IU/L
GH SERPL-MCNC: 0.08 NG/ML
GLUCOSE SERPL-MCNC: 91 MG/DL
HCT VFR BLD CALC: 38.8 %
HGB BLD-MCNC: 11.6 G/DL
IGF-1 INTERP: NORMAL
IGF-I BLD-MCNC: 102 NG/ML
IMM GRANULOCYTES NFR BLD AUTO: 0.2 %
LH SERPL-ACNC: 2.1 IU/L
LYMPHOCYTES # BLD AUTO: 2.43 K/UL
LYMPHOCYTES NFR BLD AUTO: 45 %
MAN DIFF?: NORMAL
MCHC RBC-ENTMCNC: 25.1 PG
MCHC RBC-ENTMCNC: 29.9 G/DL
MCV RBC AUTO: 84 FL
MONOCYTES # BLD AUTO: 0.47 K/UL
MONOCYTES NFR BLD AUTO: 8.7 %
NEUTROPHILS # BLD AUTO: 2.31 K/UL
NEUTROPHILS NFR BLD AUTO: 42.8 %
PLATELET # BLD AUTO: 199 K/UL
POTASSIUM SERPL-SCNC: 4.7 MMOL/L
PROT SERPL-MCNC: 6.8 G/DL
RBC # BLD: 4.62 M/UL
RBC # FLD: 14.2 %
SODIUM SERPL-SCNC: 145 MMOL/L
T4 FREE SERPL-MCNC: 1 NG/DL
WBC # FLD AUTO: 5.4 K/UL

## 2022-02-13 LAB
PROLACTIN SERPL-MCNC: 36.3 NG/ML
PROLACTIN SERPL-MCNC: 37 NG/ML

## 2022-02-18 ENCOUNTER — LABORATORY RESULT (OUTPATIENT)
Age: 72
End: 2022-02-18

## 2022-02-27 ENCOUNTER — NON-APPOINTMENT (OUTPATIENT)
Age: 72
End: 2022-02-27

## 2022-03-03 ENCOUNTER — APPOINTMENT (OUTPATIENT)
Dept: ENDOCRINOLOGY | Facility: CLINIC | Age: 72
End: 2022-03-03

## 2022-03-04 ENCOUNTER — APPOINTMENT (OUTPATIENT)
Dept: NEUROSURGERY | Facility: CLINIC | Age: 72
End: 2022-03-04
Payer: MEDICARE

## 2022-03-04 PROCEDURE — 99203 OFFICE O/P NEW LOW 30 MIN: CPT

## 2022-03-04 NOTE — REASON FOR VISIT
[New Patient Visit] : a new patient visit [Referred By: _________] : Patient was referred by EVIE [Family Member] : family member

## 2022-03-10 ENCOUNTER — APPOINTMENT (OUTPATIENT)
Dept: NEUROSURGERY | Facility: CLINIC | Age: 72
End: 2022-03-10
Payer: MEDICARE

## 2022-03-10 PROCEDURE — 99214 OFFICE O/P EST MOD 30 MIN: CPT
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
## 2022-03-20 NOTE — END OF VISIT
[FreeTextEntry3] : I reviewed this very pleasant 71 year old lady with her daughter in the neurosurgery clinic and agree with the note as documented by the ACP above. From my perspective we need to obtain her previous imaging and hormone profile and discuss with her her options for management. Although this tumor has not changed for some time, it abuts the optic apparatus and I would be inclined to recommend surgical removal. she is going to think about it and get back to us.\par \par Time spent during consultation was 30 minutes.

## 2022-03-20 NOTE — END OF VISIT
[FreeTextEntry3] : I personally reviewed this pleasant lady today again in the neurosurgery clinic together with her daughter.  We once again went over the options for management of her pituitary lesion.  I agree with the ACP note as documented above. She will get back to us about a final decision about the management option for this tumor but is leaning toward surgical therapy.  She will contact us if she wishes to proceed.  We went over in detail the risks and benefits of surgical intervention and had an opportunity to personally review her scans together with her.  Time spent during consultation was approximately 30 minutes.

## 2022-03-20 NOTE — HISTORY OF PRESENT ILLNESS
[FreeTextEntry1] : Ms. Rowe, who has a known pituitary adenoma since 2002, presents today in follow up, no acute complaints, to discuss treatment options. \par \par MRI of the brain w/w/o contrast done on 2/23 at [facility] showed large (2.3 x 2.1 x 2.8 cm) partially cystic enhancing intrasellar/suprasellar mass likely representing a macroadenoma contacting the undersurface of the optic chiasm, unchanged from 5/26/2015\par \par Today we had a lengthy discussion about the options for management of the pituitary tumor. We reviewed the scans. My recommendation was to consider endonasal endoscopic resection of the tumor, given the fact the tumor is pressing on the optic chiasm. We went over in detail the nature of the surgery, involvement with ENT, risks post operatively and the endocrine risks/ complications of surgery including but not limited to SIADH, need for hormone replacement and then other surgical risks including but not limited to CSF leakage, carotid artery injury, bleeding, stroke, infection/ meningitis and failure to achieve cure/ tumor recurrence. We also discussed non-surgical treatment such as annual MRI surveillance.  Continue Regimen: Epiduo forte hs,may spot treat in am ,ximino 90 daily Detail Level: Simple

## 2022-03-20 NOTE — PLAN
[FreeTextEntry1] : Discuss the recent MRI findings of the pituitary lesion, which I compared to the MRI in 2015 (appears similar in appearance). I discussed with her in detail that we would likely recommend surgical resection of the residual/ recurrent tumor in the form of an endonasal endoscopic approach. This procedure was discussed in detail with her and we went over the risks and benefits of surgery. We will regroup next week after I discussed with my colleagues treatment options of surveillance vs. surgery. \par She is agreeable to this plan.

## 2022-03-24 ENCOUNTER — TRANSCRIPTION ENCOUNTER (OUTPATIENT)
Age: 72
End: 2022-03-24

## 2022-04-06 NOTE — ED ADULT TRIAGE NOTE - NS ED TRIAGE AVPU SCALE
Alert-The patient is alert, awake and responds to voice. The patient is oriented to time, place, and person. The triage nurse is able to obtain subjective information. Cephalexin Counseling: I counseled the patient regarding use of cephalexin as an antibiotic for prophylactic and/or therapeutic purposes. Cephalexin (commonly prescribed under brand name Keflex) is a cephalosporin antibiotic which is active against numerous classes of bacteria, including most skin bacteria. Side effects may include nausea, diarrhea, gastrointestinal upset, rash, hives, yeast infections, and in rare cases, hepatitis, kidney disease, seizures, fever, confusion, neurologic symptoms, and others. Patients with severe allergies to penicillin medications are cautioned that there is about a 10% incidence of cross-reactivity with cephalosporins. When possible, patients with penicillin allergies should use alternatives to cephalosporins for antibiotic therapy.

## 2022-04-28 ENCOUNTER — APPOINTMENT (OUTPATIENT)
Dept: CARDIOLOGY | Facility: CLINIC | Age: 72
End: 2022-04-28
Payer: MEDICARE

## 2022-04-28 VITALS
DIASTOLIC BLOOD PRESSURE: 80 MMHG | WEIGHT: 199 LBS | TEMPERATURE: 97.5 F | HEIGHT: 67 IN | SYSTOLIC BLOOD PRESSURE: 140 MMHG | HEART RATE: 92 BPM | OXYGEN SATURATION: 98 % | BODY MASS INDEX: 31.23 KG/M2

## 2022-04-28 VITALS — DIASTOLIC BLOOD PRESSURE: 75 MMHG | SYSTOLIC BLOOD PRESSURE: 120 MMHG

## 2022-04-28 PROCEDURE — 93000 ELECTROCARDIOGRAM COMPLETE: CPT

## 2022-04-28 PROCEDURE — 99204 OFFICE O/P NEW MOD 45 MIN: CPT

## 2022-04-28 RX ORDER — POTASSIUM CHLORIDE
POWDER (GRAM) MISCELLANEOUS
Refills: 0 | Status: DISCONTINUED | COMMUNITY
End: 2022-04-28

## 2022-04-28 NOTE — REVIEW OF SYSTEMS
[Fever] : no fever [Weight Gain (___ Lbs)] : [unfilled] ~Ulb weight gain [Chills] : no chills [Feeling Fatigued] : not feeling fatigued [SOB] : no shortness of breath [Dyspnea on exertion] : dyspnea during exertion [Chest Discomfort] : no chest discomfort [Lower Ext Edema] : no extremity edema [Leg Claudication] : no intermittent leg claudication [Palpitations] : no palpitations [Orthopnea] : no orthopnea [PND] : no PND [Syncope] : no syncope [Cough] : no cough [de-identified] : skin nodules, seeing a rheumatologist

## 2022-04-28 NOTE — PHYSICAL EXAM
[Well Developed] : well developed [Well Nourished] : well nourished [No Acute Distress] : no acute distress [Normal Conjunctiva] : normal conjunctiva [No Carotid Bruit] : no carotid bruit [Normal S1, S2] : normal S1, S2 [No Murmur] : no murmur [No Rub] : no rub [No Gallop] : no gallop [Clear Lung Fields] : clear lung fields [Good Air Entry] : good air entry [No Respiratory Distress] : no respiratory distress  [Soft] : abdomen soft [Non Tender] : non-tender [Normal Bowel Sounds] : normal bowel sounds [Moves all extremities] : moves all extremities [No Focal Deficits] : no focal deficits [Normal Speech] : normal speech [No edema] : no edema [No varicosities] : no varicosities [No chronic venous stasis changes] : no chronic venous stasis changes

## 2022-04-28 NOTE — ASSESSMENT
[FreeTextEntry1] : Assessment:\par #Dyspnea on exertion\par #Abnl EKG - new LBBB \par - Compared to 2015 EKG\par #HTN - controlled\par #Dyslipidemia\par #BMI 31\par #Recent labs\par - 2/9/22 Hgb 11.6, K 4.7, Cr 0.8, AST 21, ALT 17\par \par Plan:\par - Echo and Pharm NST for dyspnea on exertion and new LBBB\par - Continue atorva 10, amlo 5, [id number]\par - Request labs recently done by PCP, would like lipid profile or Hgb A1c \par - Return to clinic in 3 months\par
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
## 2022-05-13 ENCOUNTER — APPOINTMENT (OUTPATIENT)
Dept: CARDIOLOGY | Facility: CLINIC | Age: 72
End: 2022-05-13
Payer: MEDICARE

## 2022-05-13 PROCEDURE — 93306 TTE W/DOPPLER COMPLETE: CPT

## 2022-05-16 ENCOUNTER — NON-APPOINTMENT (OUTPATIENT)
Age: 72
End: 2022-05-16

## 2022-05-17 RX ORDER — ASPIRIN 81 MG/1
81 TABLET ORAL DAILY
Qty: 90 | Refills: 2 | Status: ACTIVE | COMMUNITY
Start: 2022-05-17 | End: 1900-01-01

## 2022-05-31 ENCOUNTER — LABORATORY RESULT (OUTPATIENT)
Age: 72
End: 2022-05-31

## 2022-06-03 ENCOUNTER — OUTPATIENT (OUTPATIENT)
Dept: OUTPATIENT SERVICES | Facility: HOSPITAL | Age: 72
LOS: 1 days | Discharge: HOME | End: 2022-06-03
Payer: MEDICARE

## 2022-06-03 VITALS — HEIGHT: 67 IN | WEIGHT: 199.96 LBS

## 2022-06-03 DIAGNOSIS — Z98.890 OTHER SPECIFIED POSTPROCEDURAL STATES: Chronic | ICD-10-CM

## 2022-06-03 DIAGNOSIS — Z90.49 ACQUIRED ABSENCE OF OTHER SPECIFIED PARTS OF DIGESTIVE TRACT: Chronic | ICD-10-CM

## 2022-06-03 DIAGNOSIS — Z90.89 ACQUIRED ABSENCE OF OTHER ORGANS: Chronic | ICD-10-CM

## 2022-06-03 LAB
ANION GAP SERPL CALC-SCNC: 12 MMOL/L — SIGNIFICANT CHANGE UP (ref 7–14)
BUN SERPL-MCNC: 10 MG/DL — SIGNIFICANT CHANGE UP (ref 10–20)
CALCIUM SERPL-MCNC: 9.9 MG/DL — SIGNIFICANT CHANGE UP (ref 8.5–10.1)
CHLORIDE SERPL-SCNC: 105 MMOL/L — SIGNIFICANT CHANGE UP (ref 98–110)
CO2 SERPL-SCNC: 24 MMOL/L — SIGNIFICANT CHANGE UP (ref 17–32)
CREAT SERPL-MCNC: 0.7 MG/DL — SIGNIFICANT CHANGE UP (ref 0.7–1.5)
EGFR: 92 ML/MIN/1.73M2 — SIGNIFICANT CHANGE UP
GLUCOSE SERPL-MCNC: 90 MG/DL — SIGNIFICANT CHANGE UP (ref 70–99)
HCT VFR BLD CALC: 37.3 % — SIGNIFICANT CHANGE UP (ref 37–47)
HGB BLD-MCNC: 12 G/DL — SIGNIFICANT CHANGE UP (ref 12–16)
MCHC RBC-ENTMCNC: 25.8 PG — LOW (ref 27–31)
MCHC RBC-ENTMCNC: 32.2 G/DL — SIGNIFICANT CHANGE UP (ref 32–37)
MCV RBC AUTO: 80.2 FL — LOW (ref 81–99)
NRBC # BLD: 0 /100 WBCS — SIGNIFICANT CHANGE UP (ref 0–0)
PLATELET # BLD AUTO: 205 K/UL — SIGNIFICANT CHANGE UP (ref 130–400)
POTASSIUM SERPL-MCNC: 3.9 MMOL/L — SIGNIFICANT CHANGE UP (ref 3.5–5)
POTASSIUM SERPL-SCNC: 3.9 MMOL/L — SIGNIFICANT CHANGE UP (ref 3.5–5)
RBC # BLD: 4.65 M/UL — SIGNIFICANT CHANGE UP (ref 4.2–5.4)
RBC # FLD: 14.6 % — HIGH (ref 11.5–14.5)
SODIUM SERPL-SCNC: 141 MMOL/L — SIGNIFICANT CHANGE UP (ref 135–146)
WBC # BLD: 5.1 K/UL — SIGNIFICANT CHANGE UP (ref 4.8–10.8)
WBC # FLD AUTO: 5.1 K/UL — SIGNIFICANT CHANGE UP (ref 4.8–10.8)

## 2022-06-03 PROCEDURE — 93458 L HRT ARTERY/VENTRICLE ANGIO: CPT | Mod: 26

## 2022-06-03 RX ORDER — POTASSIUM AMINOBENZOATE 500 MG
1 TABLET ORAL
Qty: 0 | Refills: 0 | DISCHARGE

## 2022-06-03 NOTE — H&P CARDIOLOGY - HISTORY OF PRESENT ILLNESS
70 y/o female presents here today for [facility] due to dyspnea on exertion    Pre cath note:    indication:  [ ] STEMI                [ ] NSTEMI                 [ ] Acute coronary syndrome                     [ ]Unstable Angina   [ ] high risk  [ ] intermediate risk  [ ] low risk                     [ ] Stable Angina     non-invasive testing:                          Date:                     result: [ ] high risk  [ ] intermediate risk  [ ] low risk    Anti- Anginal medications:                    [x ] not used                       [ ] used                   [ ] not used but strong indication not to use    Ejection Fraction                   [ ] <29            [ x] 30-39%   [ ] 40-49%     [ ]>50%    CHF                   [ ] active (within last 14 days on meds   [ ] Chronic (on meds but no exacerbation)    COPD                   [ ] mild (on chronic bronchodilators)  [ ] moderate (on chronic steroid therapy)      [ ] severe (indication for home O2 or PACO2 >50)    Other risk factors:                       [ ] Previous MI                     [ ] CVA/ stroke                    [ ] carotid stent/ CEA                    [ ] PVD/PAD- (arterial aneurysm, non-palpable pulses, tortuous vessel with inability to insert catheter, infra-renal dissection, renal or subclavian artery stenosis)                    [ ] diabetic                    [ ] previous CABG                    [ ] Renal Failure         Cath Bleeding Risk: 2.0%     Right Burak test WNL                 70 y/o female presents here today for [facility] due to dyspnea on exertion    Pre cath note:    indication:  [ ] STEMI                [ ] NSTEMI                 [ ] Acute coronary syndrome                     [ ]Unstable Angina   [ ] high risk  [ ] intermediate risk  [ ] low risk                     [x ] Stable Angina     non-invasive testing:                          Date:                     result: [ ] high risk  [ ] intermediate risk  [ ] low risk    Anti- Anginal medications:                    [ ] not used                       [x ] used                   [ ] not used but strong indication not to use    Ejection Fraction                   [ ] <29            [ x] 30-39%   [ ] 40-49%     [ ]>50%    CHF                   [ ] active (within last 14 days on meds   [ ] Chronic (on meds but no exacerbation)    COPD                   [ ] mild (on chronic bronchodilators)  [ ] moderate (on chronic steroid therapy)      [ ] severe (indication for home O2 or PACO2 >50)    Other risk factors:                       [ ] Previous MI                     [ ] CVA/ stroke                    [ ] carotid stent/ CEA                    [ ] PVD/PAD- (arterial aneurysm, non-palpable pulses, tortuous vessel with inability to insert catheter, infra-renal dissection, renal or subclavian artery stenosis)                    [ ] diabetic                    [ ] previous CABG                    [ ] Renal Failure         Cath Bleeding Risk: 2.0%     Right Burak test WNL

## 2022-06-03 NOTE — ASU PATIENT PROFILE, ADULT - FALL HARM RISK - UNIVERSAL INTERVENTIONS
Bed in lowest position, wheels locked, appropriate side rails in place/Call bell, personal items and telephone in reach/Instruct patient to call for assistance before getting out of bed or chair/Non-slip footwear when patient is out of bed/[place] to call system/Physically safe environment - no spills, clutter or unnecessary equipment/Purposeful Proactive Rounding/Room/bathroom lighting operational, light cord in reach

## 2022-06-03 NOTE — CHART NOTE - NSCHARTNOTEFT_GEN_A_CORE
PRE-OP DIAGNOSIS:    HFrEF, unspecified cardiomyopathy    PROCEDURE:     [x] Coronary Angiogram     [x] LHC     [] LVG     [] RHC     [] Intervention (see below)         PHYSICIAN:  Dr. Espinal    ASSISTANT:  Dr. Strong       PROCEDURE DESCRIPTION:     Consent:      [x] Patient     [] Family Member     []  Used        Anesthesia:     [] General     [x] Sedation     [x] Local        Access & Closure:     [x] 6 Fr right Radial Artery (D-stat)    [] Fr Femoral Artery     [] Fr Femoral Vein     [] Fr Brachial Vein       IV Contrast: 35 mL        Intervention: none    Implants: none       FINDINGS:     Coronary Dominance: right dominant      LM: normal    LAD: minor luminal irregularities, calcified vessel  D1: minor luminal irregularities    LCX: minor luminal irregularities    RCA: minor luminal irregularities  RPDA: minor luminal irregularities        LVEDP: 10 mmHg     EF: 33% on 2d echo       ESTIMATED BLOOD LOSS: < 10 mL        CONDITION:     [x] Good     [] Fair     [] Critical        SPECIMEN REMOVED: N/A       POST-OP DIAGNOSIS:      [] Normal Coronary Angiogram     [x] Mild Coronary Artery Disease (< 50% stenosis)     [] __ Vessel Coronary Artery Disease        PLAN OF CARE:     [x] D/C Home Today     [x] Medications:   - continue with ASA, lipitor, metoprolol, and irbesartan    [x] IV Fluids:  NS@75cc/hr x 2 hours    F/U with Cardiologist as outpatient

## 2022-06-03 NOTE — H&P CARDIOLOGY - NSICDXPASTMEDICALHX_GEN_ALL_CORE_FT
PAST MEDICAL HISTORY:  Back spasm     Diverticulitis     H/O: pituitary neoplasm     HLD (hyperlipidemia)     HTN (hypertension)     Plantar fasciitis     Raynaud phenomenon     Sciatica     Sinusitis

## 2022-06-16 DIAGNOSIS — Z79.82 LONG TERM (CURRENT) USE OF ASPIRIN: ICD-10-CM

## 2022-06-16 DIAGNOSIS — I10 ESSENTIAL (PRIMARY) HYPERTENSION: ICD-10-CM

## 2022-06-16 DIAGNOSIS — M54.30 SCIATICA, UNSPECIFIED SIDE: ICD-10-CM

## 2022-06-16 DIAGNOSIS — Z88.6 ALLERGY STATUS TO ANALGESIC AGENT: ICD-10-CM

## 2022-06-16 DIAGNOSIS — Z88.8 ALLERGY STATUS TO OTHER DRUGS, MEDICAMENTS AND BIOLOGICAL SUBSTANCES STATUS: ICD-10-CM

## 2022-06-16 DIAGNOSIS — I42.9 CARDIOMYOPATHY, UNSPECIFIED: ICD-10-CM

## 2022-06-16 DIAGNOSIS — Z91.018 ALLERGY TO OTHER FOODS: ICD-10-CM

## 2022-06-16 DIAGNOSIS — Z91.048 OTHER NONMEDICINAL SUBSTANCE ALLERGY STATUS: ICD-10-CM

## 2022-06-16 DIAGNOSIS — I73.00 RAYNAUD'S SYNDROME WITHOUT GANGRENE: ICD-10-CM

## 2022-06-16 DIAGNOSIS — M72.2 PLANTAR FASCIAL FIBROMATOSIS: ICD-10-CM

## 2022-06-16 DIAGNOSIS — J32.9 CHRONIC SINUSITIS, UNSPECIFIED: ICD-10-CM

## 2022-06-16 DIAGNOSIS — E78.5 HYPERLIPIDEMIA, UNSPECIFIED: ICD-10-CM

## 2022-06-16 DIAGNOSIS — I25.10 ATHEROSCLEROTIC HEART DISEASE OF NATIVE CORONARY ARTERY WITHOUT ANGINA PECTORIS: ICD-10-CM

## 2022-06-16 DIAGNOSIS — Z82.49 FAMILY HISTORY OF ISCHEMIC HEART DISEASE AND OTHER DISEASES OF THE CIRCULATORY SYSTEM: ICD-10-CM

## 2022-06-16 DIAGNOSIS — Z88.0 ALLERGY STATUS TO PENICILLIN: ICD-10-CM

## 2022-06-16 DIAGNOSIS — Z88.2 ALLERGY STATUS TO SULFONAMIDES: ICD-10-CM

## 2022-06-21 LAB
ANION GAP SERPL CALC-SCNC: 10 MMOL/L
APTT BLD: 36.2 SEC
BASOPHILS # BLD AUTO: 0.07 K/UL
BASOPHILS NFR BLD AUTO: 1.2 %
BUN SERPL-MCNC: 16 MG/DL
CALCIUM SERPL-MCNC: 10.1 MG/DL
CHLORIDE SERPL-SCNC: 106 MMOL/L
CO2 SERPL-SCNC: 28 MMOL/L
CREAT SERPL-MCNC: 0.8 MG/DL
EGFR: 79 ML/MIN/1.73M2
EOSINOPHIL # BLD AUTO: 0.11 K/UL
EOSINOPHIL NFR BLD AUTO: 1.9 %
GLUCOSE SERPL-MCNC: 91 MG/DL
HCT VFR BLD CALC: 38.6 %
HGB BLD-MCNC: 12 G/DL
IMM GRANULOCYTES NFR BLD AUTO: 0.2 %
INR PPP: 1.03 RATIO
LYMPHOCYTES # BLD AUTO: 2.61 K/UL
LYMPHOCYTES NFR BLD AUTO: 46.2 %
MAN DIFF?: NORMAL
MCHC RBC-ENTMCNC: 25.4 PG
MCHC RBC-ENTMCNC: 31.1 G/DL
MCV RBC AUTO: 81.6 FL
MONOCYTES # BLD AUTO: 0.48 K/UL
MONOCYTES NFR BLD AUTO: 8.5 %
NEUTROPHILS # BLD AUTO: 2.37 K/UL
NEUTROPHILS NFR BLD AUTO: 42 %
PLATELET # BLD AUTO: 224 K/UL
POTASSIUM SERPL-SCNC: 4.7 MMOL/L
PT BLD: 11.8 SEC
RBC # BLD: 4.73 M/UL
RBC # FLD: 14.7 %
SODIUM SERPL-SCNC: 144 MMOL/L
WBC # FLD AUTO: 5.65 K/UL

## 2022-07-07 PROBLEM — E78.5 HYPERLIPIDEMIA, UNSPECIFIED: Chronic | Status: ACTIVE | Noted: 2022-06-03

## 2022-07-07 PROBLEM — M54.30 SCIATICA, UNSPECIFIED SIDE: Chronic | Status: ACTIVE | Noted: 2022-06-03

## 2022-07-07 PROBLEM — I10 ESSENTIAL (PRIMARY) HYPERTENSION: Chronic | Status: ACTIVE | Noted: 2022-06-03

## 2022-07-07 PROBLEM — M72.2 PLANTAR FASCIAL FIBROMATOSIS: Chronic | Status: ACTIVE | Noted: 2022-06-03

## 2022-07-07 PROBLEM — I73.00 RAYNAUD'S SYNDROME WITHOUT GANGRENE: Chronic | Status: ACTIVE | Noted: 2022-06-03

## 2022-07-07 PROBLEM — Z87.898 PERSONAL HISTORY OF OTHER SPECIFIED CONDITIONS: Chronic | Status: ACTIVE | Noted: 2022-06-03

## 2022-07-07 PROBLEM — M62.830 MUSCLE SPASM OF BACK: Chronic | Status: ACTIVE | Noted: 2022-06-03

## 2022-07-07 PROBLEM — J32.9 CHRONIC SINUSITIS, UNSPECIFIED: Chronic | Status: ACTIVE | Noted: 2022-06-03

## 2022-07-07 PROBLEM — K57.92 DIVERTICULITIS OF INTESTINE, PART UNSPECIFIED, WITHOUT PERFORATION OR ABSCESS WITHOUT BLEEDING: Chronic | Status: ACTIVE | Noted: 2022-06-03

## 2022-07-28 ENCOUNTER — APPOINTMENT (OUTPATIENT)
Dept: CARDIOLOGY | Facility: CLINIC | Age: 72
End: 2022-07-28

## 2022-07-28 VITALS
HEIGHT: 67 IN | DIASTOLIC BLOOD PRESSURE: 78 MMHG | OXYGEN SATURATION: 98 % | WEIGHT: 198 LBS | TEMPERATURE: 97.8 F | SYSTOLIC BLOOD PRESSURE: 126 MMHG | HEART RATE: 88 BPM | BODY MASS INDEX: 31.08 KG/M2

## 2022-07-28 PROCEDURE — 99213 OFFICE O/P EST LOW 20 MIN: CPT | Mod: 25

## 2022-07-28 PROCEDURE — 93000 ELECTROCARDIOGRAM COMPLETE: CPT

## 2022-07-28 RX ORDER — AMLODIPINE BESYLATE 5 MG/1
5 TABLET ORAL DAILY
Refills: 0 | Status: DISCONTINUED | COMMUNITY
End: 2022-07-28

## 2022-07-28 RX ORDER — IRBESARTAN 150 MG/1
150 TABLET ORAL DAILY
Refills: 0 | Status: DISCONTINUED | COMMUNITY
End: 2022-07-28

## 2022-07-28 RX ORDER — FUROSEMIDE 20 MG/1
20 TABLET ORAL
Refills: 0 | Status: DISCONTINUED | COMMUNITY
End: 2022-07-28

## 2022-07-28 NOTE — REVIEW OF SYSTEMS
[Negative] : Heme/Lymph [Fever] : no fever [Chills] : no chills [Feeling Fatigued] : not feeling fatigued [SOB] : no shortness of breath [Dyspnea on exertion] : not dyspnea during exertion [Chest Discomfort] : no chest discomfort [Lower Ext Edema] : no extremity edema [Leg Claudication] : no intermittent leg claudication [Palpitations] : no palpitations [Orthopnea] : no orthopnea [PND] : no PND [Syncope] : no syncope [Cough] : no cough [de-identified] : skin nodules, seeing a rheumatologist
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
## 2022-07-28 NOTE — CARDIOLOGY SUMMARY
[de-identified] : 5/3/22 TTE EF 33 % Mod to sever decreased LV syst function, Mod MVR, Mild TR  [de-identified] : 7/28/22 S/p Cardiac Cath 6/3/22 No sign CAD, mild disease [de-identified] : EKG 4/28/22: Normal sinus rhythm, LAD, LBBB -- new compared to 2015 EKG\par

## 2022-07-28 NOTE — ASSESSMENT
[FreeTextEntry1] : Assessment:\par #Non-ischemic CMP, HFrEF with LVEF 30-35%\par - Well compensated, asymptomatic, euvolemic\par - Need to optimize GDMT\par #LBBB \par #HTN - controlled\par #Dyslipidemia\par #BMI 31\par \par Plan:\par - Cont ASA 81 mg, continue atorva 10\par - D/c Amlodipine, D/C Irbesartan \par - Start Entresto 24-26mg BID, titrate up as tolerated\par - Start Metoprolol Succ ER 25mg QD, titrate up as tolerated\par - Repeat echo once on max tolerated GDMT x3 months\par - CMP in 4 weeks\par - Return to clinic in 3 months, discuss cardiac MRI\par

## 2022-07-28 NOTE — REASON FOR VISIT
[Initial Visit] : an initial visit for [CV Risk Factors and Non-Cardiac Disease] : CV risk factors and non-cardiac disease [Hyperlipidemia] : hyperlipidemia [Hypertension] : hypertension

## 2022-07-29 ENCOUNTER — APPOINTMENT (OUTPATIENT)
Dept: NEUROLOGY | Facility: CLINIC | Age: 72
End: 2022-07-29

## 2022-07-29 VITALS
HEART RATE: 87 BPM | SYSTOLIC BLOOD PRESSURE: 146 MMHG | TEMPERATURE: 98 F | DIASTOLIC BLOOD PRESSURE: 83 MMHG | WEIGHT: 195 LBS | HEIGHT: 67 IN | BODY MASS INDEX: 30.61 KG/M2

## 2022-07-29 DIAGNOSIS — I65.29 OCCLUSION AND STENOSIS OF UNSPECIFIED CAROTID ARTERY: ICD-10-CM

## 2022-07-29 DIAGNOSIS — D35.2 BENIGN NEOPLASM OF PITUITARY GLAND: ICD-10-CM

## 2022-07-29 PROCEDURE — 99212 OFFICE O/P EST SF 10 MIN: CPT

## 2022-07-29 RX ORDER — ATORVASTATIN CALCIUM 10 MG/1
10 TABLET, FILM COATED ORAL DAILY
Refills: 0 | Status: DISCONTINUED | COMMUNITY
End: 2022-07-29

## 2022-07-29 NOTE — ASSESSMENT
[FreeTextEntry1] : 71 year old woman with medical history of large pituitary mass diagnosed in 2002 and chronic lumbar and cervical spine radiculopathy is here as a follow up visit. Denies worsening of peripheral vision, neck and low back pain. We again discussed the possible surgery in the future and she would like to wait for now. She is scheduled for eye exam next week with Dr Parr. I will get carotid ultrasound to assess the carotid stenosis. \par \par - fu w eye exam \par - fu with Dr Rodas \par - Carotid Doppler \par - Repeat MRI pituitary in 6 months \par - RTC in 6 months \par

## 2022-07-29 NOTE — PHYSICAL EXAM
[FreeTextEntry1] : Mental status: Awake, alert and oriented x3.  Recent and remote memory intact.  Naming, repetition and comprehension intact.  Attention/concentration intact.  No dysarthria, no aphasia.  Fund of knowledge appropriate.  \par Cranial nerves: Pupils equally round and reactive to light, visual fields full, no nystagmus, extraocular muscles intact, V1 through V3 intact bilaterally and symmetric, face symmetric, hearing intact to finger rub, palate elevation symmetric, tongue was midline.\par Motor:  MRC grading 5/5 b/l UE/LE.   strength 5/5.  Normal tone and bulk.  No abnormal movements.  \par Sensation: Intact to light touch, proprioception, and pinprick. \par Coordination: No dysmetria on finger-to-nose and heel-to-shin.  No dysdiadokinesia.\par Reflexes: 2+ in bilateral UE/LE, downgoing toes bilaterally. (-) Johnson.\par Gait: Narrow and steady. No ataxia.  Romberg negative\par \par

## 2022-07-29 NOTE — HISTORY OF PRESENT ILLNESS
[FreeTextEntry1] : GABINO SCHAEFER is a 71 year old woman is here as a follow up visit. She was seen by Dr Rodas and planned to monitor the pituitary lesion clinically for now. She is going to have her eye exam next week. Otherwise denies headache, blurred vision or peripheral vision loss. Denies significant faust and low back pain.

## 2022-08-04 ENCOUNTER — APPOINTMENT (OUTPATIENT)
Dept: ORTHOPEDIC SURGERY | Facility: CLINIC | Age: 72
End: 2022-08-04

## 2022-08-08 ENCOUNTER — APPOINTMENT (OUTPATIENT)
Dept: OBGYN | Facility: CLINIC | Age: 72
End: 2022-08-08

## 2022-08-08 VITALS
WEIGHT: 194 LBS | BODY MASS INDEX: 30.45 KG/M2 | DIASTOLIC BLOOD PRESSURE: 81 MMHG | TEMPERATURE: 97.6 F | HEIGHT: 67 IN | HEART RATE: 92 BPM | SYSTOLIC BLOOD PRESSURE: 129 MMHG

## 2022-08-08 DIAGNOSIS — N88.2 STRICTURE AND STENOSIS OF CERVIX UTERI: ICD-10-CM

## 2022-08-08 DIAGNOSIS — N80.9 ENDOMETRIOSIS, UNSPECIFIED: ICD-10-CM

## 2022-08-08 DIAGNOSIS — Z78.0 ASYMPTOMATIC MENOPAUSAL STATE: ICD-10-CM

## 2022-08-08 DIAGNOSIS — Z12.4 ENCOUNTER FOR SCREENING FOR MALIGNANT NEOPLASM OF CERVIX: ICD-10-CM

## 2022-08-08 DIAGNOSIS — Z71.89 OTHER SPECIFIED COUNSELING: ICD-10-CM

## 2022-08-08 DIAGNOSIS — N88.9 NONINFLAMMATORY DISORDER OF CERVIX UTERI, UNSPECIFIED: ICD-10-CM

## 2022-08-08 DIAGNOSIS — Z01.419 ENCOUNTER FOR GYNECOLOGICAL EXAMINATION (GENERAL) (ROUTINE) W/OUT ABNORMAL FINDINGS: ICD-10-CM

## 2022-08-08 DIAGNOSIS — N89.8 OTHER SPECIFIED NONINFLAMMATORY DISORDERS OF VAGINA: ICD-10-CM

## 2022-08-08 PROCEDURE — 99397 PER PM REEVAL EST PAT 65+ YR: CPT

## 2022-08-10 LAB — HPV HIGH+LOW RISK DNA PNL CVX: NOT DETECTED

## 2022-08-11 PROBLEM — Z71.89 OTHER SPECIFIED COUNSELING: Status: ACTIVE | Noted: 2020-08-04

## 2022-08-11 PROBLEM — Z12.4 ENCOUNTER FOR SCREENING FOR CERVICAL CANCER: Status: ACTIVE | Noted: 2020-07-31

## 2022-08-11 PROBLEM — Z01.419 WOMEN'S ANNUAL ROUTINE GYNECOLOGICAL EXAMINATION: Status: ACTIVE | Noted: 2020-07-31

## 2022-08-11 PROBLEM — Z78.0 MENOPAUSE: Status: ACTIVE | Noted: 2020-08-04

## 2022-08-11 PROBLEM — N88.2 CERVICAL STENOSIS (UTERINE CERVIX): Status: ACTIVE | Noted: 2020-08-25

## 2022-08-11 PROBLEM — N80.9 ENDOMETRIOTIC CYST: Status: ACTIVE | Noted: 2020-08-25

## 2022-08-11 PROBLEM — N88.9 LESION OF CERVIX: Status: RESOLVED | Noted: 2020-07-31 | Resolved: 2022-08-11

## 2022-08-11 PROBLEM — N89.8 VAGINAL CYST: Status: ACTIVE | Noted: 2022-08-11

## 2022-08-11 NOTE — COUNSELING
[Nutrition/ Exercise/ Weight Management] : nutrition, exercise, weight management [Vitamins/Supplements] : vitamins/supplements [Breast Self Exam] : breast self exam [Bladder Hygiene] : bladder hygiene [Contraception/ Emergency Contraception/ Safe Sexual Practices] : contraception, emergency contraception, safe sexual practices [STD (testing, results, tx)] : STD (testing, results, tx) [Lab Results] : lab results [Medication Management] : medication management [Pre/Post Op Instructions] : pre/post op instructions

## 2022-08-11 NOTE — HISTORY OF PRESENT ILLNESS
[Patient reported mammogram was normal] : Patient reported mammogram was normal [Patient reported bone density results were normal] : Patient reported bone density results were normal [Patient reported colonoscopy was normal] : Patient reported colonoscopy was normal [LMP unknown] : LMP unknown [postmenopausal] : postmenopausal [N] : Patient is not sexually active [Y] : Positive pregnancy history [unknown] : Patient is unsure of the date of her LMP [Menarche Age: ____] : age at menarche was [unfilled] [Currently In Menopause] : currently in menopause [Menopause Age: ____] : age at menopause was [unfilled] [Previously active] : previously active [HPV test offered] : HPV test offered [Men] : men [No] : No [Patient reported PAP Smear was normal] : Patient reported PAP Smear was normal [TextBox_4] : 71-year-old para 2-0-0-2 and menopause came for annual GYN exam.  Patient denies postmenopausal bleeding, pelvic pain, discharge or dysuria.  She denies history of abnormal Pap, STDs, fibroids or ovarian cysts.  At her last visit she was noted to have a cervical lesion and also a vaginal cystic lesion that was to be followed up.  She also had pelvic ultrasound that showed a possible lower uterine segment cystic lesion/polyp that was also to be followed up.  The cervical lesion was biopsied and was benign.  Patient did not follow-up until now.  She continues to deny symptoms and has a history of multiple cysts in her body.  After further counseling and recommended follow-up and evaluation patient states "I feel good and have no symptoms or complaints", and she does not want any further imaging or work-up regarding previously seen questionable pathology. [Mammogramdate] : 2021 [TextBox_19] : Patient has referral [PapSmeardate] : 2020 [BoneDensityDate] : 2020 [TextBox_37] : Will give referral [ColonoscopyDate] : 2018 [TextBox_43] : 5-year follow-up [PGHxTotal] : 2 [[facility] : [facility] [PGHxPremature] : 0 [PGHxAbortions] : 0 [[facility]] : 2 [PGHxABInduced] : 0 [PGHxABSpont] : 0 [PGHxEctopic] : 0 [PGHxMultBirths] : 0 [FreeTextEntry1] :

## 2022-08-11 NOTE — PHYSICAL EXAM
[Appropriately responsive] : appropriately responsive [Alert] : alert [No Acute Distress] : no acute distress [No Lymphadenopathy] : no lymphadenopathy [Regular Rate Rhythm] : regular rate rhythm [Soft] : soft [Non-tender] : non-tender [Non-distended] : non-distended [No Mass] : no mass [Oriented x3] : oriented x3 [Examination Of The Breasts] : a normal appearance [No Discharge] : no discharge [No Masses] : no breast masses were palpable [Vulvar Atrophy] : vulvar atrophy [No Lesions] : no lesions  [Labia Majora] : normal [Labia Minora] : normal [Normal] : normal [Atrophy] : atrophy [No Bleeding] : There was no active vaginal bleeding [Cervical Stenosis] : cervical stenosis [Tenderness] : nontender [Enlarged ___ wks] : not enlarged [Mass ___ cm] : no uterine mass was palpated [Uterine Adnexae] : normal [FreeTextEntry4] : 1 cm mid vaginal posterior/left lateral wall cyst again noted without any significant change, nontender and mobile [FreeTextEntry5] : Previous cervical lesion that was biopsied appears completely resolved, cervical stenosis still noted [FreeTextEntry6] : Exam limited to habitus

## 2022-08-11 NOTE — DISCUSSION/SUMMARY
[FreeTextEntry1] : Previous imaging and lab results again discussed and recommendation for reevaluation and potential intervention and management however patient states that she has no symptoms and feels good and does not want any further evaluation or work-up at this time.  Patient was given strict pain and bleeding precautions and she states if she starts to have symptoms she will return for evaluation.\par \par A: 71-year-old for annual GYN exam, vulvovaginal atrophy, vaginal cyst/lesion, endometrial cyst/polyp?,  BMI 30\par \par P: GYN exam done\par Pap smear done\par Safe sex practices effective\par Pain and bleeding precautions\par Referral for DEXA given\par Yearly mammogram recommended\par Follow-up with GI as recommended\par Encouraged to continue healthy diet and exercise\par Follow-up for routine exam or as needed

## 2022-08-13 ENCOUNTER — APPOINTMENT (OUTPATIENT)
Dept: ORTHOPEDIC SURGERY | Facility: CLINIC | Age: 72
End: 2022-08-13

## 2022-08-15 LAB — CYTOLOGY CVX/VAG DOC THIN PREP: NORMAL

## 2022-09-02 LAB
ALBUMIN SERPL ELPH-MCNC: 4.9 G/DL
ALP BLD-CCNC: 153 U/L
ALT SERPL-CCNC: 38 U/L
ANION GAP SERPL CALC-SCNC: 13 MMOL/L
AST SERPL-CCNC: 47 U/L
BILIRUB SERPL-MCNC: 1 MG/DL
BUN SERPL-MCNC: 22 MG/DL
CALCIUM SERPL-MCNC: 9.8 MG/DL
CHLORIDE SERPL-SCNC: 104 MMOL/L
CO2 SERPL-SCNC: 25 MMOL/L
CREAT SERPL-MCNC: 1.4 MG/DL
EGFR: 40 ML/MIN/1.73M2
GLUCOSE SERPL-MCNC: 98 MG/DL
POTASSIUM SERPL-SCNC: 4.9 MMOL/L
PROT SERPL-MCNC: 7.1 G/DL
SODIUM SERPL-SCNC: 142 MMOL/L

## 2022-10-06 ENCOUNTER — APPOINTMENT (OUTPATIENT)
Dept: CARDIOLOGY | Facility: CLINIC | Age: 72
End: 2022-10-06

## 2022-10-06 VITALS
OXYGEN SATURATION: 99 % | BODY MASS INDEX: 30.32 KG/M2 | HEART RATE: 98 BPM | HEIGHT: 67 IN | WEIGHT: 193.2 LBS | DIASTOLIC BLOOD PRESSURE: 100 MMHG | SYSTOLIC BLOOD PRESSURE: 156 MMHG

## 2022-10-06 DIAGNOSIS — E66.9 OBESITY, UNSPECIFIED: ICD-10-CM

## 2022-10-06 DIAGNOSIS — Z71.82 EXERCISE COUNSELING: ICD-10-CM

## 2022-10-06 PROCEDURE — 99214 OFFICE O/P EST MOD 30 MIN: CPT

## 2022-10-06 RX ORDER — TIZANIDINE 2 MG/1
2 TABLET ORAL
Qty: 30 | Refills: 5 | Status: DISCONTINUED | COMMUNITY
Start: 2022-02-01 | End: 2022-10-06

## 2022-10-06 RX ORDER — SACUBITRIL AND VALSARTAN 24; 26 MG/1; MG/1
24-26 TABLET, FILM COATED ORAL TWICE DAILY
Qty: 90 | Refills: 3 | Status: DISCONTINUED | COMMUNITY
Start: 2022-07-28 | End: 2022-10-06

## 2022-10-06 NOTE — REASON FOR VISIT
[CV Risk Factors and Non-Cardiac Disease] : CV risk factors and non-cardiac disease [Hyperlipidemia] : hyperlipidemia [Hypertension] : hypertension [Follow-Up Visit] : a follow-up visit for

## 2022-10-06 NOTE — CARDIOLOGY SUMMARY
[de-identified] : EKG 4/28/22: Normal sinus rhythm, LAD, LBBB -- new compared to 2015 EKG\par 07/28/22 LBBB  NSR Hr 88 \par  [de-identified] : 5/3/22 TTE EF 33 % Mod to sever decreased LV syst function, Mod MVR, Mild TR  [de-identified] : 7/28/22 S/p Cardiac Cath 6/3/22 No sign CAD, mild disease

## 2022-10-06 NOTE — ASSESSMENT
[FreeTextEntry1] : Assessment:\par #Non-ischemic CMP, HFrEF with LVEF 30-35%\par - Well compensated, asymptomatic, euvolemic\par - Need to optimize GDMT\par - Did not tolerate low dose Entresto 24-26 mg BID, caused over-diuresis and diarrhea, she tried it for 2 months\par #LBBB \par #HTN -b/p 156/100\par #Dyslipidemia\par #BMI 31\par \par Plan:\par - Cont ASA 81 mg, continue atorva 10\par - Start Valsartan 160mg daily, reviewed side effects, patient to call office if she does not tolerate\par - Cont Metoprolol Succ ER 25mg QD, titrate up as tolerated\par - Low Na, Low Fat diet \par - Return to clinic in 6 weeks for BP check, discuss cardiac MRI\par

## 2022-10-06 NOTE — REVIEW OF SYSTEMS
[Negative] : Heme/Lymph [Fever] : no fever [Chills] : no chills [Feeling Fatigued] : not feeling fatigued [SOB] : no shortness of breath [Dyspnea on exertion] : not dyspnea during exertion [Chest Discomfort] : no chest discomfort [Lower Ext Edema] : no extremity edema [Leg Claudication] : no intermittent leg claudication [Palpitations] : no palpitations [Orthopnea] : no orthopnea [PND] : no PND [Syncope] : no syncope [Cough] : no cough [FreeTextEntry5] : s [FreeTextEntry9] : sleeps upright due to back pain [de-identified] : skin nodules, seeing a rheumatologist
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
## 2022-10-26 LAB — NT-PROBNP SERPL-MCNC: 750 PG/ML

## 2022-10-31 LAB
ALBUMIN SERPL ELPH-MCNC: 4.5 G/DL
ALP BLD-CCNC: 148 U/L
ALT SERPL-CCNC: 17 U/L
ANION GAP SERPL CALC-SCNC: 9 MMOL/L
AST SERPL-CCNC: 19 U/L
BILIRUB SERPL-MCNC: 0.7 MG/DL
BUN SERPL-MCNC: 18 MG/DL
CALCIUM SERPL-MCNC: 10.2 MG/DL
CHLORIDE SERPL-SCNC: 107 MMOL/L
CO2 SERPL-SCNC: 27 MMOL/L
CREAT SERPL-MCNC: 0.8 MG/DL
EGFR: 78 ML/MIN/1.73M2
GLUCOSE SERPL-MCNC: 97 MG/DL
MAGNESIUM SERPL-MCNC: 2 MG/DL
POTASSIUM SERPL-SCNC: 4.9 MMOL/L
PROT SERPL-MCNC: 7 G/DL
SODIUM SERPL-SCNC: 143 MMOL/L

## 2022-11-04 ENCOUNTER — NON-APPOINTMENT (OUTPATIENT)
Age: 72
End: 2022-11-04

## 2022-11-04 RX ORDER — POTASSIUM CHLORIDE
POWDER (GRAM) MISCELLANEOUS
Refills: 0 | Status: DISCONTINUED | COMMUNITY
End: 2022-11-04

## 2022-11-15 LAB
ALBUMIN SERPL ELPH-MCNC: 4.8 G/DL
ALP BLD-CCNC: 171 U/L
ALT SERPL-CCNC: 43 U/L
ANION GAP SERPL CALC-SCNC: 13 MMOL/L
AST SERPL-CCNC: 39 U/L
BILIRUB SERPL-MCNC: 0.7 MG/DL
BUN SERPL-MCNC: 13 MG/DL
CALCIUM SERPL-MCNC: 10.3 MG/DL
CHLORIDE SERPL-SCNC: 104 MMOL/L
CO2 SERPL-SCNC: 25 MMOL/L
CREAT SERPL-MCNC: 1 MG/DL
EGFR: 60 ML/MIN/1.73M2
GLUCOSE SERPL-MCNC: 95 MG/DL
MAGNESIUM SERPL-MCNC: 2.1 MG/DL
POTASSIUM SERPL-SCNC: 5.2 MMOL/L
PROT SERPL-MCNC: 7.2 G/DL
SODIUM SERPL-SCNC: 142 MMOL/L

## 2022-11-16 LAB — NT-PROBNP SERPL-MCNC: 262 PG/ML

## 2022-11-17 ENCOUNTER — APPOINTMENT (OUTPATIENT)
Dept: CARDIOLOGY | Facility: CLINIC | Age: 72
End: 2022-11-17

## 2022-11-17 VITALS
WEIGHT: 192.5 LBS | SYSTOLIC BLOOD PRESSURE: 160 MMHG | HEART RATE: 76 BPM | OXYGEN SATURATION: 100 % | DIASTOLIC BLOOD PRESSURE: 86 MMHG | BODY MASS INDEX: 30.21 KG/M2 | HEIGHT: 67 IN

## 2022-11-17 PROCEDURE — 93000 ELECTROCARDIOGRAM COMPLETE: CPT

## 2022-11-17 PROCEDURE — 99214 OFFICE O/P EST MOD 30 MIN: CPT | Mod: 25

## 2022-11-17 RX ORDER — METOPROLOL SUCCINATE 25 MG/1
25 TABLET, EXTENDED RELEASE ORAL DAILY
Qty: 90 | Refills: 3 | Status: DISCONTINUED | COMMUNITY
Start: 2022-07-28 | End: 2022-11-17

## 2022-11-19 NOTE — ASSESSMENT
[FreeTextEntry1] : Assessment:\par #Non-ischemic CMP, HFrEF with LVEF 30-35%\par - Well compensated, asymptomatic, euvolemic\par - Need to optimize GDMT\par - Did not tolerate low dose Entresto 24-26 mg BID, caused over-diuresis and diarrhea, she tried it for 2 months\par #LBBB \par #HTN -b/p today 160/86 reports increased B/p at home \par #Dyslipidemia\par #BMI 31\par \par Plan:\par - Cont ASA 81 mg, continue atorva 10\par - Cont Spironolactone 25 mg daily, Valsartan 160mg daily\par - D/c Metoprolol Succ 25 mg BID, start Carvedilol 12.5mg BID for better BP control\par - Low Na, Low Fat diet \par - Cardiac MRI to further assess non-ischemic CMP\par - Keep a B/p and pulse log \par - Telehealth f/u for BP check, in 2 weeks \par - F/u in 3 months and PRN

## 2022-11-19 NOTE — REVIEW OF SYSTEMS
[Feeling Fatigued] : feeling fatigued [Negative] : Cardiovascular [Chills] : no chills [Abdominal Pain] : no abdominal pain [Cough] : no cough [Dizziness] : no dizziness

## 2022-11-19 NOTE — REVIEW OF SYSTEMS
[Feeling Fatigued] : feeling fatigued [Negative] : Cardiovascular [Chills] : no chills [Cough] : no cough [Abdominal Pain] : no abdominal pain [Dizziness] : no dizziness

## 2022-11-19 NOTE — CARDIOLOGY SUMMARY
[de-identified] : EKG 11/17/22 LBBB HR 70bpm \par EKG 07/28/22 LBBB NSR Hr 88 \par EKG 4/28/22: Normal sinus rhythm, LAD, LBBB -- new compared to 2015 EKG\par  [de-identified] : 5/3/22 TTE EF 33 % Mod to sever decreased LV syst function, Mod MVR, Mild TR
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
## 2022-11-19 NOTE — CARDIOLOGY SUMMARY
[de-identified] : 5/3/22 TTE EF 33 % Mod to sever decreased LV syst function, Mod MVR, Mild TR  [de-identified] : EKG 11/17/22 LBBB HR 70bpm \par EKG 07/28/22 LBBB NSR Hr 88 \par EKG 4/28/22: Normal sinus rhythm, LAD, LBBB -- new compared to 2015 EKG\par
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
## 2022-12-05 ENCOUNTER — APPOINTMENT (OUTPATIENT)
Dept: CARDIOLOGY | Facility: CLINIC | Age: 72
End: 2022-12-05

## 2022-12-05 PROCEDURE — 99442: CPT

## 2023-02-02 ENCOUNTER — OUTPATIENT (OUTPATIENT)
Dept: OUTPATIENT SERVICES | Facility: HOSPITAL | Age: 73
LOS: 1 days | Discharge: HOME | End: 2023-02-02
Payer: MEDICARE

## 2023-02-02 ENCOUNTER — RESULT REVIEW (OUTPATIENT)
Age: 73
End: 2023-02-02

## 2023-02-02 DIAGNOSIS — I50.9 HEART FAILURE, UNSPECIFIED: ICD-10-CM

## 2023-02-02 DIAGNOSIS — Z90.49 ACQUIRED ABSENCE OF OTHER SPECIFIED PARTS OF DIGESTIVE TRACT: Chronic | ICD-10-CM

## 2023-02-02 DIAGNOSIS — Z90.89 ACQUIRED ABSENCE OF OTHER ORGANS: Chronic | ICD-10-CM

## 2023-02-02 DIAGNOSIS — Z98.890 OTHER SPECIFIED POSTPROCEDURAL STATES: Chronic | ICD-10-CM

## 2023-02-02 PROCEDURE — 75561 CARDIAC MRI FOR MORPH W/DYE: CPT | Mod: 26,MH

## 2023-02-06 ENCOUNTER — APPOINTMENT (OUTPATIENT)
Dept: CARDIOLOGY | Facility: CLINIC | Age: 73
End: 2023-02-06
Payer: MEDICARE

## 2023-02-06 VITALS
WEIGHT: 187 LBS | HEIGHT: 67 IN | SYSTOLIC BLOOD PRESSURE: 138 MMHG | HEART RATE: 74 BPM | BODY MASS INDEX: 29.35 KG/M2 | TEMPERATURE: 97.8 F | DIASTOLIC BLOOD PRESSURE: 90 MMHG | RESPIRATION RATE: 14 BRPM | OXYGEN SATURATION: 98 %

## 2023-02-06 DIAGNOSIS — R93.1 ABNORMAL FINDINGS ON DIAGNOSTIC IMAGING OF HEART AND CORONARY CIRCULATION: ICD-10-CM

## 2023-02-06 DIAGNOSIS — R06.00 DYSPNEA, UNSPECIFIED: ICD-10-CM

## 2023-02-06 DIAGNOSIS — R74.01 ELEVATION OF LEVELS OF LIVER TRANSAMINASE LEVELS: ICD-10-CM

## 2023-02-06 PROCEDURE — 99214 OFFICE O/P EST MOD 30 MIN: CPT

## 2023-02-06 RX ORDER — CARVEDILOL 12.5 MG/1
12.5 TABLET, FILM COATED ORAL DAILY
Refills: 0 | Status: DISCONTINUED | COMMUNITY
End: 2023-02-06

## 2023-02-06 RX ORDER — CARVEDILOL 25 MG/1
25 TABLET, FILM COATED ORAL
Qty: 60 | Refills: 6 | Status: DISCONTINUED | COMMUNITY
Start: 2022-11-17 | End: 2023-02-06

## 2023-02-07 NOTE — PHYSICAL EXAM
[Well Developed] : well developed [Well Nourished] : well nourished [No Acute Distress] : no acute distress [Normal Conjunctiva] : normal conjunctiva [No Carotid Bruit] : no carotid bruit [Normal S1, S2] : normal S1, S2 [No Murmur] : no murmur [No Rub] : no rub [No Gallop] : no gallop [Clear Lung Fields] : clear lung fields [Good Air Entry] : good air entry [No Respiratory Distress] : no respiratory distress  [Soft] : abdomen soft [Non Tender] : non-tender [No Masses/organomegaly] : no masses/organomegaly [Normal Bowel Sounds] : normal bowel sounds [Moves all extremities] : moves all extremities [No Focal Deficits] : no focal deficits [Normal Speech] : normal speech [No edema] : no edema [No varicosities] : no varicosities [No chronic venous stasis changes] : no chronic venous stasis changes [No rashes] : no rashes
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
## 2023-02-07 NOTE — REVIEW OF SYSTEMS
[Negative] : Cardiovascular [Chills] : no chills [Feeling Fatigued] : not feeling fatigued [Cough] : no cough [Abdominal Pain] : no abdominal pain [Dizziness] : no dizziness

## 2023-02-07 NOTE — ASSESSMENT
[FreeTextEntry1] : Assessment:\par #Non-ischemic CMP, HFrEF with LVEF 30-35%\par - Well compensated, asymptomatic, euvolemic\par - Did not tolerate low dose Entresto 24-26 mg BID, caused over-diuresis and diarrhea, she tried it for 2 months\par - Cardiac MRI with no LGE\par #LBBB \par #HTN -b/p today 160/86 reports increased B/p at home \par #Dyslipidemia\par #BMI 31\par \par Plan:\par - Cont ASA 81 mg, continue atorva 10\par - Cont Spironolactone 25 mg daily, Valsartan 160mg daily\par - Carvedilol 18.75 mg daily as she is taking for now\par - Labs prior to next visit\par - Low Na, Low Fat diet \par - Referral to GI for transaminitis and history of hepatitis \par - F/u in 3 months and PRN \par \par I, Dr. Clark, personally performed the evaluation and management (E/M) services for this established patient who presents today with (a) new problem(s)/exacerbation of (an) existing condition(s). That E/M includes conducting the clinically appropriate interval history &/or exam, assessing all new/exacerbated conditions, and establishing a new plan of care. Today, LUZ Tyler was here to observe &/or participate in the visit & follow plan of care established by me. \par \par \par \par \par \par

## 2023-02-07 NOTE — CARDIOLOGY SUMMARY
[de-identified] : EKG 11/17/22 LBBB HR 70bpm \par EKG 07/28/22 LBBB NSR Hr 88 \par EKG 4/28/22: Normal sinus rhythm, LAD, LBBB -- new compared to 2015 EKG\par  [de-identified] : 5/3/22 TTE EF 33 % Mod to sever decreased LV syst function, Mod MVR, Mild TR

## 2023-03-15 ENCOUNTER — APPOINTMENT (OUTPATIENT)
Dept: ORTHOPEDIC SURGERY | Facility: CLINIC | Age: 73
End: 2023-03-15
Payer: MEDICARE

## 2023-03-15 DIAGNOSIS — M17.12 UNILATERAL PRIMARY OSTEOARTHRITIS, LEFT KNEE: ICD-10-CM

## 2023-03-15 DIAGNOSIS — M54.16 RADICULOPATHY, LUMBAR REGION: ICD-10-CM

## 2023-03-15 PROCEDURE — 20610 DRAIN/INJ JOINT/BURSA W/O US: CPT | Mod: LT

## 2023-03-15 PROCEDURE — 99213 OFFICE O/P EST LOW 20 MIN: CPT | Mod: 25

## 2023-03-15 NOTE — HISTORY OF PRESENT ILLNESS
[de-identified] : History of Present Illness\par 71-year-old woman known to the office here with leg pain on the left I have a little bit of pain on the right and achy\par spasm feeling in both legs some of the pain is behind the knee she has had some knee pain in the past has had a torn\par meniscus has some arthritis in the knee had a cortisone injection failed gel injection 09/21/2017 was quite helpful\par Monovisc some back problems lumbar and cervical some sciatica and uses pain management\par just recently had a cardiac catheterization which was unremarkable

## 2023-03-15 NOTE — ASSESSMENT
[FreeTextEntry1] :  discussed options today of a Monovisc injection  she has good success in the  past  I  discussed with patient today the  option of a  Visco supplement injection to the left knee.  risks and benefits were  reviewed a  consent was  given,  with sterile technique through a lateral approach the gel injection was delivered and  tolerated well. a Band-Aid was applied   I will see her back in 6 months\par

## 2023-03-15 NOTE — IMAGING
[de-identified] :  pleasant easy to examine no distress some swelling crepitus knee limited flexion antalgic gait calf soft negative Homans sign\par  lumbar spine spasm straight leg

## 2023-04-26 LAB
ALBUMIN SERPL ELPH-MCNC: 4.7 G/DL
ALP BLD-CCNC: 154 U/L
ALT SERPL-CCNC: 9 U/L
ANION GAP SERPL CALC-SCNC: 10 MMOL/L
AST SERPL-CCNC: 15 U/L
BILIRUB SERPL-MCNC: 0.4 MG/DL
BUN SERPL-MCNC: 17 MG/DL
CALCIUM SERPL-MCNC: 10.1 MG/DL
CHLORIDE SERPL-SCNC: 106 MMOL/L
CHOLEST SERPL-MCNC: 164 MG/DL
CO2 SERPL-SCNC: 27 MMOL/L
CREAT SERPL-MCNC: 0.8 MG/DL
EGFR: 78 ML/MIN/1.73M2
ESTIMATED AVERAGE GLUCOSE: 100 MG/DL
GLUCOSE SERPL-MCNC: 88 MG/DL
HBA1C MFR BLD HPLC: 5.1 %
HDLC SERPL-MCNC: 46 MG/DL
LDLC SERPL CALC-MCNC: 91 MG/DL
NONHDLC SERPL-MCNC: 118 MG/DL
POTASSIUM SERPL-SCNC: 5.5 MMOL/L
PROT SERPL-MCNC: 6.9 G/DL
SODIUM SERPL-SCNC: 143 MMOL/L
TRIGL SERPL-MCNC: 137 MG/DL

## 2023-05-11 ENCOUNTER — APPOINTMENT (OUTPATIENT)
Dept: CARDIOLOGY | Facility: CLINIC | Age: 73
End: 2023-05-11
Payer: MEDICARE

## 2023-05-11 VITALS
OXYGEN SATURATION: 95 % | WEIGHT: 186 LBS | HEART RATE: 88 BPM | HEIGHT: 67 IN | SYSTOLIC BLOOD PRESSURE: 136 MMHG | DIASTOLIC BLOOD PRESSURE: 88 MMHG | BODY MASS INDEX: 29.19 KG/M2

## 2023-05-11 DIAGNOSIS — I44.7 LEFT BUNDLE-BRANCH BLOCK, UNSPECIFIED: ICD-10-CM

## 2023-05-11 PROCEDURE — 93000 ELECTROCARDIOGRAM COMPLETE: CPT

## 2023-05-11 PROCEDURE — 99214 OFFICE O/P EST MOD 30 MIN: CPT | Mod: 25

## 2023-05-11 RX ORDER — OLOPATADINE HYDROCHLORIDE AND MOMETASONE FUROATE 25; 665 UG/1; UG/1
665-25 SPRAY, METERED NASAL
Refills: 0 | Status: ACTIVE | COMMUNITY

## 2023-05-12 PROBLEM — I44.7 LBBB (LEFT BUNDLE BRANCH BLOCK): Status: ACTIVE | Noted: 2022-11-17

## 2023-05-12 NOTE — ASSESSMENT
[FreeTextEntry1] : Assessment:\par #Non-ischemic CMP, HFrEF with LVEF 30-35%\par - Well compensated, asymptomatic, euvolemic\par - Did not tolerate low dose Entresto 24-26 mg BID, caused over-diuresis and diarrhea, she tried it for 2 months\par - Cardiac MRI with no LGE\par #LBBB \par #HTN -b/p today 160/86 reports increased B/p at home \par #Dyslipidemia\par - 4/20/23 , , HDL 46, LDL 91 on atorva 10\par #BMI 31\par \par 4/20/23\par K 5.5\par Cr 0.8\par \par A1c 5.1%\par \par Plan:\par - Repeat CMP in 2 weeks to check K given dietary changes, LFT assessment\par - Echo for reassessment of EF on GDMT\par - Cont ASA 81 mg, continue atorva 10\par - Cont Spironolactone 25 mg daily, Valsartan 160mg daily\par - Carvedilol 18.75 mg daily as she is taking for now\par - Low Na, Low Fat diet \par - F/u in 3 months and PRN \par \par I, Dr. Clark, personally performed the evaluation and management (E/M) services for this established patient who presents today with (a) new problem(s)/exacerbation of (an) existing condition(s). That E/M includes conducting the clinically appropriate interval history &/or exam, assessing all new/exacerbated conditions, and establishing a new plan of care. Today, NP Jessica Tyler was here to observe &/or participate in the visit & follow plan of care established by me. \par \par \par \par \par \par
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
## 2023-05-12 NOTE — CARDIOLOGY SUMMARY
[de-identified] : 5/3/22 TTE EF 33 % Mod to sever decreased LV syst function, Mod MVR, Mild TR  [de-identified] : EKG 5/12/23 Normal sinus rhythm 88 bpm, first degree AVB, LBBB, QTc 516 ms\par EKG 11/17/22 LBBB HR 70bpm \par EKG 07/28/22 LBBB NSR Hr 88 \par EKG 4/28/22: Normal sinus rhythm, LAD, LBBB -- new compared to 2015 EKG\par \par

## 2023-06-27 ENCOUNTER — INPATIENT (INPATIENT)
Facility: HOSPITAL | Age: 73
LOS: 1 days | Discharge: HOME | DRG: 556 | End: 2023-06-29
Attending: INTERNAL MEDICINE | Admitting: STUDENT IN AN ORGANIZED HEALTH CARE EDUCATION/TRAINING PROGRAM
Payer: MEDICARE

## 2023-06-27 VITALS
DIASTOLIC BLOOD PRESSURE: 77 MMHG | RESPIRATION RATE: 18 BRPM | SYSTOLIC BLOOD PRESSURE: 135 MMHG | OXYGEN SATURATION: 99 % | WEIGHT: 195.11 LBS | HEART RATE: 88 BPM | TEMPERATURE: 98 F

## 2023-06-27 DIAGNOSIS — Z90.49 ACQUIRED ABSENCE OF OTHER SPECIFIED PARTS OF DIGESTIVE TRACT: Chronic | ICD-10-CM

## 2023-06-27 DIAGNOSIS — Z90.89 ACQUIRED ABSENCE OF OTHER ORGANS: Chronic | ICD-10-CM

## 2023-06-27 DIAGNOSIS — Z98.890 OTHER SPECIFIED POSTPROCEDURAL STATES: Chronic | ICD-10-CM

## 2023-06-27 LAB
BASOPHILS # BLD AUTO: 0.06 K/UL — SIGNIFICANT CHANGE UP (ref 0–0.2)
BASOPHILS NFR BLD AUTO: 0.8 % — SIGNIFICANT CHANGE UP (ref 0–1)
EOSINOPHIL # BLD AUTO: 0.18 K/UL — SIGNIFICANT CHANGE UP (ref 0–0.7)
EOSINOPHIL NFR BLD AUTO: 2.4 % — SIGNIFICANT CHANGE UP (ref 0–8)
HCT VFR BLD CALC: 37.1 % — SIGNIFICANT CHANGE UP (ref 37–47)
HGB BLD-MCNC: 11.6 G/DL — LOW (ref 12–16)
IMM GRANULOCYTES NFR BLD AUTO: 0.3 % — SIGNIFICANT CHANGE UP (ref 0.1–0.3)
LYMPHOCYTES # BLD AUTO: 2.91 K/UL — SIGNIFICANT CHANGE UP (ref 1.2–3.4)
LYMPHOCYTES # BLD AUTO: 38.5 % — SIGNIFICANT CHANGE UP (ref 20.5–51.1)
MCHC RBC-ENTMCNC: 25.6 PG — LOW (ref 27–31)
MCHC RBC-ENTMCNC: 31.3 G/DL — LOW (ref 32–37)
MCV RBC AUTO: 81.9 FL — SIGNIFICANT CHANGE UP (ref 81–99)
MONOCYTES # BLD AUTO: 0.55 K/UL — SIGNIFICANT CHANGE UP (ref 0.1–0.6)
MONOCYTES NFR BLD AUTO: 7.3 % — SIGNIFICANT CHANGE UP (ref 1.7–9.3)
NEUTROPHILS # BLD AUTO: 3.83 K/UL — SIGNIFICANT CHANGE UP (ref 1.4–6.5)
NEUTROPHILS NFR BLD AUTO: 50.7 % — SIGNIFICANT CHANGE UP (ref 42.2–75.2)
NRBC # BLD: 0 /100 WBCS — SIGNIFICANT CHANGE UP (ref 0–0)
PLATELET # BLD AUTO: 206 K/UL — SIGNIFICANT CHANGE UP (ref 130–400)
PMV BLD: 11.2 FL — HIGH (ref 7.4–10.4)
RBC # BLD: 4.53 M/UL — SIGNIFICANT CHANGE UP (ref 4.2–5.4)
RBC # FLD: 14 % — SIGNIFICANT CHANGE UP (ref 11.5–14.5)
WBC # BLD: 7.55 K/UL — SIGNIFICANT CHANGE UP (ref 4.8–10.8)
WBC # FLD AUTO: 7.55 K/UL — SIGNIFICANT CHANGE UP (ref 4.8–10.8)

## 2023-06-27 PROCEDURE — 99285 EMERGENCY DEPT VISIT HI MDM: CPT

## 2023-06-27 PROCEDURE — 73564 X-RAY EXAM KNEE 4 OR MORE: CPT | Mod: 26,RT

## 2023-06-27 RX ORDER — KETOROLAC TROMETHAMINE 30 MG/ML
15 SYRINGE (ML) INJECTION ONCE
Refills: 0 | Status: DISCONTINUED | OUTPATIENT
Start: 2023-06-27 | End: 2023-06-27

## 2023-06-28 DIAGNOSIS — M25.561 PAIN IN RIGHT KNEE: ICD-10-CM

## 2023-06-28 LAB
ALBUMIN SERPL ELPH-MCNC: 4.4 G/DL — SIGNIFICANT CHANGE UP (ref 3.5–5.2)
ALP SERPL-CCNC: 136 U/L — HIGH (ref 30–115)
ALT FLD-CCNC: 11 U/L — SIGNIFICANT CHANGE UP (ref 0–41)
ANION GAP SERPL CALC-SCNC: 8 MMOL/L — SIGNIFICANT CHANGE UP (ref 7–14)
AST SERPL-CCNC: 31 U/L — SIGNIFICANT CHANGE UP (ref 0–41)
BASE EXCESS BLDV CALC-SCNC: -1.4 MMOL/L — SIGNIFICANT CHANGE UP (ref -2–3)
BILIRUB SERPL-MCNC: 0.5 MG/DL — SIGNIFICANT CHANGE UP (ref 0.2–1.2)
BUN SERPL-MCNC: 25 MG/DL — HIGH (ref 10–20)
CA-I SERPL-SCNC: 1.29 MMOL/L — SIGNIFICANT CHANGE UP (ref 1.15–1.33)
CALCIUM SERPL-MCNC: 9.4 MG/DL — SIGNIFICANT CHANGE UP (ref 8.4–10.4)
CHLORIDE SERPL-SCNC: 110 MMOL/L — SIGNIFICANT CHANGE UP (ref 98–110)
CO2 SERPL-SCNC: 23 MMOL/L — SIGNIFICANT CHANGE UP (ref 17–32)
CREAT SERPL-MCNC: 1 MG/DL — SIGNIFICANT CHANGE UP (ref 0.7–1.5)
EGFR: 60 ML/MIN/1.73M2 — SIGNIFICANT CHANGE UP
GAS PNL BLDV: 139 MMOL/L — SIGNIFICANT CHANGE UP (ref 136–145)
GAS PNL BLDV: SIGNIFICANT CHANGE UP
GLUCOSE SERPL-MCNC: 113 MG/DL — HIGH (ref 70–99)
HCO3 BLDV-SCNC: 26 MMOL/L — SIGNIFICANT CHANGE UP (ref 22–29)
HCT VFR BLDA CALC: 35 % — LOW (ref 39–51)
HGB BLD CALC-MCNC: 11.8 G/DL — LOW (ref 12.6–17.4)
LACTATE BLDV-MCNC: 0.7 MMOL/L — SIGNIFICANT CHANGE UP (ref 0.5–2)
PCO2 BLDV: 52 MMHG — HIGH (ref 39–42)
PH BLDV: 7.3 — LOW (ref 7.32–7.43)
PO2 BLDV: 35 MMHG — SIGNIFICANT CHANGE UP
POTASSIUM BLDV-SCNC: 4.3 MMOL/L — SIGNIFICANT CHANGE UP (ref 3.5–5.1)
POTASSIUM SERPL-MCNC: 6.3 MMOL/L — CRITICAL HIGH (ref 3.5–5)
POTASSIUM SERPL-SCNC: 6.3 MMOL/L — CRITICAL HIGH (ref 3.5–5)
PROT SERPL-MCNC: 7.2 G/DL — SIGNIFICANT CHANGE UP (ref 6–8)
SAO2 % BLDV: 59.6 % — SIGNIFICANT CHANGE UP
SODIUM SERPL-SCNC: 141 MMOL/L — SIGNIFICANT CHANGE UP (ref 135–146)

## 2023-06-28 PROCEDURE — 84132 ASSAY OF SERUM POTASSIUM: CPT

## 2023-06-28 PROCEDURE — 99222 1ST HOSP IP/OBS MODERATE 55: CPT

## 2023-06-28 PROCEDURE — 82306 VITAMIN D 25 HYDROXY: CPT

## 2023-06-28 PROCEDURE — 86803 HEPATITIS C AB TEST: CPT

## 2023-06-28 PROCEDURE — 82977 ASSAY OF GGT: CPT

## 2023-06-28 PROCEDURE — 97161 PT EVAL LOW COMPLEX 20 MIN: CPT | Mod: GP

## 2023-06-28 PROCEDURE — 36415 COLL VENOUS BLD VENIPUNCTURE: CPT

## 2023-06-28 RX ORDER — CARVEDILOL PHOSPHATE 80 MG/1
18.7 CAPSULE, EXTENDED RELEASE ORAL DAILY
Refills: 0 | Status: DISCONTINUED | OUTPATIENT
Start: 2023-06-28 | End: 2023-06-28

## 2023-06-28 RX ORDER — TRAMADOL HYDROCHLORIDE 50 MG/1
50 TABLET ORAL ONCE
Refills: 0 | Status: DISCONTINUED | OUTPATIENT
Start: 2023-06-28 | End: 2023-06-28

## 2023-06-28 RX ORDER — SPIRONOLACTONE 25 MG/1
1 TABLET, FILM COATED ORAL
Refills: 0 | DISCHARGE

## 2023-06-28 RX ORDER — VALSARTAN 80 MG/1
1 TABLET ORAL
Refills: 0 | DISCHARGE

## 2023-06-28 RX ORDER — ASPIRIN/CALCIUM CARB/MAGNESIUM 324 MG
81 TABLET ORAL DAILY
Refills: 0 | Status: DISCONTINUED | OUTPATIENT
Start: 2023-06-28 | End: 2023-06-29

## 2023-06-28 RX ORDER — ACETAMINOPHEN 500 MG
325 TABLET ORAL EVERY 4 HOURS
Refills: 0 | Status: DISCONTINUED | OUTPATIENT
Start: 2023-06-28 | End: 2023-06-28

## 2023-06-28 RX ORDER — CARVEDILOL PHOSPHATE 80 MG/1
18.75 CAPSULE, EXTENDED RELEASE ORAL DAILY
Refills: 0 | Status: DISCONTINUED | OUTPATIENT
Start: 2023-06-28 | End: 2023-06-29

## 2023-06-28 RX ORDER — CARVEDILOL PHOSPHATE 80 MG/1
1.5 CAPSULE, EXTENDED RELEASE ORAL
Refills: 0 | DISCHARGE

## 2023-06-28 RX ORDER — POTASSIUM CHLORIDE 20 MEQ
200 PACKET (EA) ORAL
Qty: 0 | Refills: 0 | DISCHARGE

## 2023-06-28 RX ORDER — LIDOCAINE 4 G/100G
1 CREAM TOPICAL EVERY 24 HOURS
Refills: 0 | Status: DISCONTINUED | OUTPATIENT
Start: 2023-06-28 | End: 2023-06-29

## 2023-06-28 RX ORDER — ASPIRIN/CALCIUM CARB/MAGNESIUM 324 MG
1 TABLET ORAL
Qty: 0 | Refills: 0 | DISCHARGE

## 2023-06-28 RX ORDER — KETOROLAC TROMETHAMINE 30 MG/ML
15 SYRINGE (ML) INJECTION ONCE
Refills: 0 | Status: DISCONTINUED | OUTPATIENT
Start: 2023-06-28 | End: 2023-06-28

## 2023-06-28 RX ORDER — ACETAMINOPHEN 500 MG
650 TABLET ORAL EVERY 6 HOURS
Refills: 0 | Status: DISCONTINUED | OUTPATIENT
Start: 2023-06-28 | End: 2023-06-29

## 2023-06-28 RX ORDER — TIZANIDINE 4 MG/1
2 TABLET ORAL
Qty: 0 | Refills: 0 | DISCHARGE

## 2023-06-28 RX ORDER — VALSARTAN 80 MG/1
160 TABLET ORAL DAILY
Refills: 0 | Status: DISCONTINUED | OUTPATIENT
Start: 2023-06-28 | End: 2023-06-29

## 2023-06-28 RX ORDER — ATORVASTATIN CALCIUM 80 MG/1
10 TABLET, FILM COATED ORAL AT BEDTIME
Refills: 0 | Status: DISCONTINUED | OUTPATIENT
Start: 2023-06-28 | End: 2023-06-29

## 2023-06-28 RX ORDER — ATORVASTATIN CALCIUM 80 MG/1
1 TABLET, FILM COATED ORAL
Qty: 0 | Refills: 0 | DISCHARGE

## 2023-06-28 RX ORDER — ENOXAPARIN SODIUM 100 MG/ML
40 INJECTION SUBCUTANEOUS EVERY 24 HOURS
Refills: 0 | Status: DISCONTINUED | OUTPATIENT
Start: 2023-06-28 | End: 2023-06-29

## 2023-06-28 RX ORDER — AMLODIPINE BESYLATE 2.5 MG/1
1 TABLET ORAL
Qty: 0 | Refills: 0 | DISCHARGE

## 2023-06-28 RX ORDER — IRBESARTAN 75 MG/1
1 TABLET ORAL
Qty: 0 | Refills: 0 | DISCHARGE

## 2023-06-28 RX ADMIN — Medication 15 MILLIGRAM(S): at 00:04

## 2023-06-28 RX ADMIN — ATORVASTATIN CALCIUM 10 MILLIGRAM(S): 80 TABLET, FILM COATED ORAL at 21:50

## 2023-06-28 RX ADMIN — Medication 15 MILLIGRAM(S): at 03:49

## 2023-06-28 RX ADMIN — TRAMADOL HYDROCHLORIDE 50 MILLIGRAM(S): 50 TABLET ORAL at 23:56

## 2023-06-28 NOTE — H&P ADULT - NSHPLABSRESULTS_GEN_ALL_CORE
11.6   7.55  )-----------( 206      ( 27 Jun 2023 22:40 )             37.1       06-27    141  |  110  |  25<H>  ----------------------------<  113<H>  6.3<HH>   |  23  |  1.0    Ca    9.4      27 Jun 2023 22:40    TPro  7.2  /  Alb  4.4  /  TBili  0.5  /  DBili  x   /  AST  31  /  ALT  11  /  AlkPhos  136<H>  06-27          Right Knee XR Findings/impression: Medial femorotibial joint space narrowing is seen. 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 No acute displaced fracture.

## 2023-06-28 NOTE — ED PROVIDER NOTE - CLINICAL SUMMARY MEDICAL DECISION MAKING FREE TEXT BOX
Patient presented for evaluation of right knee pain and been unable to bear weight.  Required labs and imaging, no acute findings.  Will admit for further evaluation and management.

## 2023-06-28 NOTE — H&P ADULT - HISTORY OF PRESENT ILLNESS
Pt is a 73 y/o F with a PMH of HTN, HLD, chronic sinusitis, Raynaud's, Spinal and left knee meniscal injury and sciatica presenting with right knee pain after she hyperextended her knee while bowling yesterday. Pt denies lightheadedness, syncope, dizziness or chest pain at the time of the incident. Pt's pain is located at the posterior right knee, she rates the pain a 7/10, but says it is bearable. She also says she is having shooting pain down her right leg, similar to her pain with sciatica. She leads a very active lifestyle with bowling, swimming and other activities and has not had any falls recently. Pt currently denies lightheadedness, dizziness, nausea or chest pain.    ED Course:  Pt was given two IV Toradol 15 mg pushes for pain  Right knee XR showed medial femorotibial joint space narrowing, femoral patellar arthrosis and no acute displaced fracture  Ortho was consulted  Right knee split was applied in the ED for immobilization

## 2023-06-28 NOTE — ED PROVIDER NOTE - ATTENDING APP SHARED VISIT CONTRIBUTION OF CARE
I personally evaluated the patient. I reviewed the Resident’s or Physician Assistant’s note (as assigned above), and agree with the findings and plan except as documented in my note.  72-year-old female with history of HTN, HLD presents with right knee pain.  Patient reports bowling when suddenly her knee gave way and she was unable to bear weight.  Denies any fall, no twisting, no popping or cracking sound.  Reports that pain is more in the posterior fossa, no knee swelling.  VSS, non toxic appearing, NAD, Head NCAT, MMM, neck supple, normal ROM, normal s1s2, lungs ctab, abd s/nt/nd, no guarding or rebound, extremities with tenderness to palpation in the right posterior fossa of the knee, bilateral lower extremity neurovascularly intact, AAO x 3, GCS 15, neuro grossly normal. No acute skin lesions. Plan is labs, imaging, meds and reassess.

## 2023-06-28 NOTE — ED PROVIDER NOTE - OBJECTIVE STATEMENT
72-year-old female history of hypertension, hyperlipidemia, asthma presenting to ER with right posterior knee pain.  Patient states was bowling when went to throw a bowling bowl felt sudden pain to right posterior knee.  Denies any trauma/falls but states has not been able to bear weight since.  No skin discoloration, swelling, calf pain, paresthesia/numbness.

## 2023-06-28 NOTE — H&P ADULT - NSHPPHYSICALEXAM_GEN_ALL_CORE
GENERAL: NAD, lying in bed comfortably  HEAD:  Atraumatic, Normocephalic  EYES: conjunctiva and sclera clear  CHEST/LUNG: Clear to auscultation bilaterally; Unlabored respirations  HEART: Regular rate and rhythm  EXTREMITIES:  No clubbing, cyanosis, or edema,   MSK: Right knee secured with split. No pain or weakness on ankle plantarflexion, bilaterally; dp and pt pulses are palpable  NERVOUS SYSTEM:  A&Ox3

## 2023-06-28 NOTE — H&P ADULT - ASSESSMENT
Pt is a 71 y/o F with a PMH of HTN, DLD, chronic sinusitis, Raynaud's, Spinal and left knee meniscal injury and sciatica presenting with right knee pain after she hyperextended her knee while bowling yesterday concerning for fracture or soft tissue injury of the right knee.    #Right knee pain  - Pt hyperextended knee, having posterior knee pain  - Pt received toradol in the ED  - R knee splint applied in ED  - R knee XR showed no acute displaced fracture  - Ortho consulted  - Will consult PT for activity  - Lidocaine patch q24 and tylenol prn for right knee pain    #Elevated Alk Phos  - Ordered GGT to r/o liver issues    #HTN  - continue valsartan, spironolactone and carvedilol    #HLD  - continue aspirin and atorvastatin    GI ppx: not indicated  DVT ppx: Heparin  Diet: DASH  Activity: with PT  Dispo: Admit Pt is a 73 y/o F with a PMH of HTN, DLD, chronic sinusitis, Raynaud's, Spinal and left knee meniscal injury and sciatica presenting with right knee pain after she hyperextended her knee while bowling yesterday concerning for fracture or soft tissue injury of the right knee.    #Right knee pain  - Pt hyperextended knee, having posterior knee pain  - Pt received toradol in the ED  - R knee splint applied in ED  - R knee XR showed no acute displaced fracture  - Ortho consulted  - Will consult PT for activity  - Lidocaine patch q24 and tylenol prn for right knee pain    #Elevated Alk Phos  - Ordered GGT to r/o liver issues    #HTN  - continue valsartan and carvedilol  - K is 6.3 (hemolyzed blood), hold spironolactone for now  - repeat K in AM to restart spironolactone    #HLD  - continue aspirin and atorvastatin    GI ppx: not indicated  DVT ppx: Lovenox  Diet: DASH  Activity: with PT  Dispo: Admit

## 2023-06-28 NOTE — H&P ADULT - ATTENDING COMMENTS
Pt is a 71 y/o F with a PMH of HTN, DLD, chronic sinusitis, Raynaud's, Spinal and left knee meniscal injury and sciatica presenting with right knee pain after she hyperextended her knee while bowling yesterday concerning for fracture or soft tissue injury of the right knee.    1. Right knee pain mainly in the medial area   * Pt hyperextended knee, having posterior knee pain  - Admit to medicine                  -Right knee x-ray: no fracture   - Pain meds prn   - R knee splint applied in ED  - Ortho consulted  - PT consult:pending   - Lidocaine patch q24 and tylenol prn for right knee pain    2. Elevated Alk Phos  - Ordered GGT to r/o liver issues    3. HTN  - continue valsartan and carvedilol  - K is 6.3 (hemolyzed blood), hold spironolactone for now  - repeat K in AM to restart spironolactone    4. HLD  - continue aspirin and atorvastatin    GI ppx: not indicated  DVT ppx: Lovenox  Diet: DASH  Activity: with PT  Dispo: Admit

## 2023-06-28 NOTE — ED PROVIDER NOTE - PHYSICAL EXAMINATION
CONSTITUTIONAL: Well-appearing; well-nourished; in no apparent distress.   EYES: PERRL; EOM intact.   ENT: normal nose; no rhinorrhea; normal pharynx with no tonsillar hypertrophy.   NECK: Supple; non-tender; no cervical lymphadenopathy.   CARDIOVASCULAR: Normal S1, S2; no murmurs, rubs, or gallops. Equal radial pulses  RESPIRATORY: Normal chest excursion with respiration; breath sounds clear and equal bilaterally; no wheezes, rhonchi, or rales.  GI/: Normal bowel sounds; non-distended; non-tender; no palpable organomegaly.   MS: No evidence of trauma or deformity.+ decreased flexion of rt knee. + ttp to posterior knee distal pulses are normal.   SKIN: Normal for age and race; warm; dry; good turgor; no apparent lesions or exudate.   NEURO/PSYCH: A & O x 4; grossly unremarkable. mood and manner are appropriate. neurovascular intact. Sensation intact

## 2023-06-29 ENCOUNTER — TRANSCRIPTION ENCOUNTER (OUTPATIENT)
Age: 73
End: 2023-06-29

## 2023-06-29 VITALS
TEMPERATURE: 98 F | DIASTOLIC BLOOD PRESSURE: 67 MMHG | RESPIRATION RATE: 18 BRPM | SYSTOLIC BLOOD PRESSURE: 119 MMHG | HEART RATE: 66 BPM

## 2023-06-29 LAB
24R-OH-CALCIDIOL SERPL-MCNC: 17 NG/ML — LOW (ref 30–80)
GGT SERPL-CCNC: 19 U/L — SIGNIFICANT CHANGE UP (ref 1–40)
HCV AB S/CO SERPL IA: 0.04 COI — SIGNIFICANT CHANGE UP
HCV AB SERPL-IMP: SIGNIFICANT CHANGE UP
POTASSIUM SERPL-MCNC: 5 MMOL/L — SIGNIFICANT CHANGE UP (ref 3.5–5)
POTASSIUM SERPL-SCNC: 5 MMOL/L — SIGNIFICANT CHANGE UP (ref 3.5–5)

## 2023-06-29 PROCEDURE — 99239 HOSP IP/OBS DSCHRG MGMT >30: CPT

## 2023-06-29 RX ORDER — LIDOCAINE 4 G/100G
1 CREAM TOPICAL
Qty: 10 | Refills: 0
Start: 2023-06-29

## 2023-06-29 RX ORDER — ACETAMINOPHEN 500 MG
2 TABLET ORAL
Qty: 30 | Refills: 0
Start: 2023-06-29

## 2023-06-29 RX ORDER — DICLOFENAC SODIUM 30 MG/G
2 GEL TOPICAL
Qty: 1 | Refills: 0
Start: 2023-06-29

## 2023-06-29 RX ADMIN — ATORVASTATIN CALCIUM 10 MILLIGRAM(S): 80 TABLET, FILM COATED ORAL at 21:12

## 2023-06-29 RX ADMIN — VALSARTAN 160 MILLIGRAM(S): 80 TABLET ORAL at 07:06

## 2023-06-29 RX ADMIN — Medication 650 MILLIGRAM(S): at 16:13

## 2023-06-29 RX ADMIN — TRAMADOL HYDROCHLORIDE 50 MILLIGRAM(S): 50 TABLET ORAL at 00:30

## 2023-06-29 RX ADMIN — CARVEDILOL PHOSPHATE 18.75 MILLIGRAM(S): 80 CAPSULE, EXTENDED RELEASE ORAL at 07:06

## 2023-06-29 RX ADMIN — ENOXAPARIN SODIUM 40 MILLIGRAM(S): 100 INJECTION SUBCUTANEOUS at 11:16

## 2023-06-29 RX ADMIN — Medication 81 MILLIGRAM(S): at 11:15

## 2023-06-29 RX ADMIN — Medication 650 MILLIGRAM(S): at 18:20

## 2023-06-29 NOTE — DISCHARGE NOTE PROVIDER - ATTENDING DISCHARGE PHYSICAL EXAMINATION:
VITALS:   T(C): 36.8 (06-29-23 @ 12:40), Max: 36.8 (06-29-23 @ 12:40)  HR: 70 (06-29-23 @ 12:40) (64 - 70)  BP: 102/58 (06-29-23 @ 12:40) (102/58 - 126/69)  RR: 18 (06-29-23 @ 12:40) (18 - 18)  SpO2: 100% (06-28-23 @ 22:40) (100% - 100%)    GENERAL: NAD, lying in bed comfortably  HEART: Regular rate and rhythm,  LUNGS: Unlabored respirations.  Clear to auscultation bilaterally,  ABDOMEN: Soft, nontender, nondistended, +BS  EXTREMITIES: 2+ peripheral pulses bilaterally, right knee pain tender upon palpation

## 2023-06-29 NOTE — DISCHARGE NOTE PROVIDER - NSDCFUSCHEDAPPT_GEN_ALL_CORE_FT
Poonam Clark  [facility]  CARDIOLOGY [address]  Scheduled Appointment: 08/28/2023    Toby Martin  [facility]  ONCORTHO [address]  Scheduled Appointment: 09/07/2023

## 2023-06-29 NOTE — CONSULT NOTE ADULT - SUBJECTIVE AND OBJECTIVE BOX
right knee pain  CONSULT  T(C): 36.7 (06-29-23 @ 06:33), Max: 36.7 (06-29-23 @ 06:33)  HR: 64 (06-29-23 @ 06:33) (64 - 68)  BP: 126/69 (06-29-23 @ 06:33) (113/67 - 126/69)  RR: 18 (06-29-23 @ 06:33) (18 - 18)  SpO2: 100% (06-28-23 @ 22:40) (100% - 100%)    Right knee pain    Family history of CVA (Father)    Handoff    MEWS Score    Sinusitis    Diverticulitis    H/O: pituitary neoplasm    Sciatica    HTN (hypertension)    HLD (hyperlipidemia)    Raynaud phenomenon    Plantar fasciitis    Back spasm    Right knee pain    History of tonsillectomy    S/P cholecystectomy    H/O vitrectomy    KNEE INJURY    90+    Ambulatory dysfunction    SysAdmin_VisitLink                            11.6   7.55  )-----------( 206      ( 27 Jun 2023 22:40 )             37.1     06-29    x   |  x   |  x   ----------------------------<  x   5.0   |  x   |  x     Ca    9.4      27 Jun 2023 22:40    TPro  7.2  /  Alb  4.4  /  TBili  0.5  /  DBili  x   /  AST  31  /  ALT  11  /  AlkPhos  136<H>  06-27      acetaminophen     Tablet .. 650 milliGRAM(s) Oral every 6 hours PRN  aspirin enteric coated 81 milliGRAM(s) Oral daily  atorvastatin 10 milliGRAM(s) Oral at bedtime  carvedilol 18.75 milliGRAM(s) Oral daily  enoxaparin Injectable 40 milliGRAM(s) SubCutaneous every 24 hours  lidocaine   4% Patch 1 Patch Transdermal every 24 hours  valsartan 160 milliGRAM(s) Oral daily    ragweed (Unknown)  Soma (Unknown)  Chicken (Unknown)  Bextra (Unknown)  chocolate (Unknown)  sulfa drugs (Short breath; Rash)  dust (Unknown)  doxycycline (Rash)  penicillin (Short breath)  Kale (Unknown)  adhesives (Unknown)  cortisone (Anaphylaxis)  menthol containing compounds (Unknown)    72y    This pt presents with an exacerbation of her right sided sciatica as well as new right posteromedial thigh pain.  This pt is well known to Dr Martin who has cared for her sciatica for years, as well as a left meniscal tear.  The pt is very active , cycling Bowling and swimming. tuesday the pt was bowling and felt a pull in the posteromedial aspect of her thigh leading to an awkward twisting maneuver to avoid falling.  now having difficulty ambulating on the right side.  imaging

## 2023-06-29 NOTE — PHYSICAL THERAPY INITIAL EVALUATION ADULT - ADDITIONAL COMMENTS
Pt lives with daughter with +ramp entrance to the house and 12 steps(optional) inside. She can stay on the first floor as per pt.

## 2023-06-29 NOTE — DISCHARGE NOTE PROVIDER - NSDCFUADDINST_GEN_ALL_CORE_FT
Weight bearing as tolerated over right leg with brace.  Continue with graded physical therapy as advised under supervision.  Please make sure to follow up with your orthopedic doctor.

## 2023-06-29 NOTE — DISCHARGE NOTE PROVIDER - HOSPITAL COURSE
Pt is a 73 y/o F with a PMH of HTN, HLD, chronic sinusitis, Raynaud's, Spinal and left knee meniscal injury and sciatica presented with right knee pain after she hyperextended her knee while bowling. Pt denied lightheadedness, syncope, dizziness or chest pain at the time of the incident. Pt's pain located at the posterior right knee, she rated the pain a 7/10, but said it is bearable. She also said she had shooting pain down her right leg, similar to her pain with sciatica. She leads a very active lifestyle with bowling, swimming and other activities and has not had any falls recently. Pt denied lightheadedness, dizziness, nausea or chest pain.    1. Right knee pain mainly in the medial area   * Pt hyperextended knee, having posterior knee pain  - Admit to medicine                  -Right knee x-ray: no fracture   - Pain meds prn   - R knee splint applied in ED  - Ortho consulted  - PT consulted  - Lidocaine patch q24 and tylenol prn for right knee pain    2. Elevated Alk Phos  - GGT WNL    3. HTN  - continued valsartan and carvedilol  - Potassium WNL    4. HLD  - continued aspirin and atorvastatin    The patient is medically stable for discharge. Pt is a 71 y/o F with a PMH of HTN, HLD, chronic sinusitis, Raynaud's, Spinal and left knee meniscal injury and sciatica presented with right knee pain after she hyperextended her knee while bowling. Pt denied lightheadedness, syncope, dizziness or chest pain at the time of the incident. Pt's pain located at the posterior right knee, she rated the pain a 7/10, but said it is bearable. She also said she had shooting pain down her right leg, similar to her pain with sciatica. She leads a very active lifestyle with bowling, swimming and other activities and has not had any falls recently. Pt denied lightheadedness, dizziness, nausea or chest pain.    1. Right knee pain mainly in the medial area seems torn harmstring as per ortho   * Pt hyperextended knee, having posterior knee pain  - Admit to medicine                  -Right knee x-ray: no fracture   - Pain meds prn   - R knee splint applied in ED  - Ortho consulted  - PT consulted  - Lidocaine patch q24 and tylenol prn for right knee pain    2. Elevated Alk Phos  - GGT WNL    3. HTN  - continued valsartan and carvedilol  - Potassium WNL    4. HLD  - continued aspirin and atorvastatin    The patient is medically stable for discharge.

## 2023-06-29 NOTE — DISCHARGE NOTE PROVIDER - NSDCCPCAREPLAN_GEN_ALL_CORE_FT
PRINCIPAL DISCHARGE DIAGNOSIS  Diagnosis: Right knee pain  Assessment and Plan of Treatment: You presented to the ED on 6/28/23 with right knee pain. In the ED, you were given pain medication (Toradol) and an x-ray showed no fracture. A knee splint was applied.      SECONDARY DISCHARGE DIAGNOSES  Diagnosis: Ambulatory dysfunction  Assessment and Plan of Treatment:      PRINCIPAL DISCHARGE DIAGNOSIS  Diagnosis: Right knee pain  Assessment and Plan of Treatment: You presented to the ED on 6/28/23 with right knee pain. In the ED, you were given pain medication (Toradol) and an x-ray showed no fracture. A knee splint was applied. You were evaluated by orthopedic surgery and physical therapy and were medically cleared for discharge on 6/29/23. Please follow up with your primary care doctor and your orthopedic physician and take medications as prescribed.      SECONDARY DISCHARGE DIAGNOSES  Diagnosis: Ambulatory dysfunction  Assessment and Plan of Treatment:

## 2023-06-29 NOTE — PHYSICAL THERAPY INITIAL EVALUATION ADULT - PERTINENT HX OF CURRENT PROBLEM, REHAB EVAL
Pt is a 71 y/o F with a PMH of HTN, DLD, chronic sinusitis, Raynaud's, Spinal and left knee meniscal injury and sciatica presenting with right knee pain after she hyperextended her knee while bowling yesterday concerning for fracture or soft tissue injury of the right knee. R knee XR showed no acute displaced fracture

## 2023-06-29 NOTE — DISCHARGE NOTE PROVIDER - NSDCMRMEDTOKEN_GEN_ALL_CORE_FT
Aspir 81 oral delayed release tablet: 1 tab(s) orally once a day  carvedilol 12.5 mg oral tablet: 1.5 orally once a day  Lipitor 10 mg oral tablet: 1 tab(s) orally once a day  spironolactone 25 mg oral tablet: 1 orally once a day  valsartan 160 mg oral capsule: 1 orally once a day   Aspir 81 oral delayed release tablet: 1 tab(s) orally once a day  carvedilol 12.5 mg oral tablet: 1.5 orally once a day  diclofenac 1% topical gel: Apply topically to affected area 3 times a day  lidocaine 4% topical film: Apply topically to affected area once a day as needed for  moderate pain  Lipitor 10 mg oral tablet: 1 tab(s) orally once a day  spironolactone 25 mg oral tablet: 1 orally once a day  Tylenol 325 mg oral tablet: 2 tab(s) orally every 6 hours as needed for  mild pain  valsartan 160 mg oral capsule: 1 orally once a day

## 2023-06-29 NOTE — DISCHARGE NOTE NURSING/CASE MANAGEMENT/SOCIAL WORK - PATIENT PORTAL LINK FT
You can access the FollowMyHealth Patient Portal offered by [facility] by registering at the following website: [email or web address]. By joining [facility]’s FollowMyHealth portal, you will also be able to view your health information using other applications (apps) compatible with our system.

## 2023-06-29 NOTE — DISCHARGE NOTE PROVIDER - CARE PROVIDER_API CALL
Toby Martin  Orthopaedic Surgery  [address] [place] [address]  Phone: [phone]  Fax: [phone]  Follow Up Time:

## 2023-06-29 NOTE — PHYSICAL THERAPY INITIAL EVALUATION ADULT - GENERAL OBSERVATIONS, REHAB EVAL
1:26-2:00pm Pt encountered in semi-cuenca position in bed, A & O x 4 in NAD, c/o pain 8/10 Rt post knee but agreeable to PT. Pt is indep in bed mobility, CGA sit/stand transfer and ambulated 150 ft close supervision using RW with step-to gait/antalgic gait. Pt declined stair training stated she has 12 steps at home(optional) but she can stay on the first floor. Pt left in bed as found after PT ihsan, applied ice pack x 20mins to Rt post knee. Pt will benefit from skilled PT 3-5x/wk for thera ex, transfer act and gait training.

## 2023-07-06 DIAGNOSIS — M54.31 SCIATICA, RIGHT SIDE: ICD-10-CM

## 2023-07-06 DIAGNOSIS — I10 ESSENTIAL (PRIMARY) HYPERTENSION: ICD-10-CM

## 2023-07-06 DIAGNOSIS — I73.00 RAYNAUD'S SYNDROME WITHOUT GANGRENE: ICD-10-CM

## 2023-07-06 DIAGNOSIS — M25.561 PAIN IN RIGHT KNEE: ICD-10-CM

## 2023-07-06 DIAGNOSIS — E78.5 HYPERLIPIDEMIA, UNSPECIFIED: ICD-10-CM

## 2023-07-06 DIAGNOSIS — J32.9 CHRONIC SINUSITIS, UNSPECIFIED: ICD-10-CM

## 2023-07-07 NOTE — ED PROCEDURE NOTE - ATTENDING APP SHARED VISIT CONTRIBUTION OF CARE
I was present for and supervised the key/critical aspects of the procedures performed during the care of the patient.    Splint

## 2023-07-11 ENCOUNTER — APPOINTMENT (OUTPATIENT)
Dept: ORTHOPEDIC SURGERY | Facility: CLINIC | Age: 73
End: 2023-07-11
Payer: MEDICARE

## 2023-07-11 PROCEDURE — 99213 OFFICE O/P EST LOW 20 MIN: CPT

## 2023-07-11 NOTE — ASSESSMENT
[FreeTextEntry1] :  recommended heat light exercise might consider some therapy Advil would be good no reason for an injection MRI today I think the sciatica will slowly improve return in 6 weeks   suggested a cane

## 2023-07-11 NOTE — REASON FOR VISIT
[Family Member] : family member [FreeTextEntry2] : lower back and bilateral knees Last visit in March for her left knee  did get a little relief [place] in that knee she bowls 3 times a week and on the 20/6 started bowling had a little pain in the right leg seem to hyperextend pulled her hamstring knee and  exacerbated some sciatica hospital 3 days slowly improving on a walker started due little exercise had knee x-rays which showed some arthritis can take Advil just started by

## 2023-07-11 NOTE — IMAGING
[de-identified] :  pleasant he is examined in some stress ambulates slowly with a limp to the right mild limits in knee motion diffuse tenderness   no instability\par  x-rays from the hospital mild moderate arthritis

## 2023-08-15 ENCOUNTER — APPOINTMENT (OUTPATIENT)
Dept: ORTHOPEDIC SURGERY | Facility: CLINIC | Age: 73
End: 2023-08-15

## 2023-08-28 ENCOUNTER — APPOINTMENT (OUTPATIENT)
Dept: CARDIOLOGY | Facility: CLINIC | Age: 73
End: 2023-08-28
Payer: MEDICARE

## 2023-08-28 VITALS
BODY MASS INDEX: 31.08 KG/M2 | SYSTOLIC BLOOD PRESSURE: 132 MMHG | HEIGHT: 67 IN | DIASTOLIC BLOOD PRESSURE: 85 MMHG | WEIGHT: 198 LBS | OXYGEN SATURATION: 96 % | HEART RATE: 76 BPM

## 2023-08-28 DIAGNOSIS — I10 ESSENTIAL (PRIMARY) HYPERTENSION: ICD-10-CM

## 2023-08-28 PROCEDURE — 99214 OFFICE O/P EST MOD 30 MIN: CPT

## 2023-08-29 PROBLEM — I10 HTN (HYPERTENSION): Status: ACTIVE | Noted: 2020-08-04

## 2023-08-29 NOTE — REVIEW OF SYSTEMS
[Negative] : Cardiovascular [Chills] : no chills [Feeling Fatigued] : not feeling fatigued [Cough] : no cough [Abdominal Pain] : no abdominal pain [Dizziness] : no dizziness [FreeTextEntry9] : hamstring strain

## 2023-08-29 NOTE — ASSESSMENT
[FreeTextEntry1] : Assessment: #Non-ischemic CMP, HFrEF with LVEF 30-35% - Well compensated, asymptomatic, euvolemic - Did not tolerate low dose Entresto 24-26 mg BID, caused over-diuresis and diarrhea, she tried it for 2 months - Cardiac MRI with no LGE #LBBB  #HTN -b/p today 160/86 reports increased B/p at home  #Dyslipidemia - 4/20/23 , , HDL 46, LDL 91 on atorva 10 #BMI 31  6/28/23 K 5  Send extra coreg to ramya  Plan: - Echo for reassessment of EF on GDMT - Cont ASA 81 mg, continue atorva 10 - Cont Spironolactone 25 mg daily, Valsartan 160mg daily - Carvedilol 18.75 mg daily as she is taking for now - Low Na, Low Fat diet  - F/u in 3 months and PRN
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
## 2023-08-29 NOTE — CARDIOLOGY SUMMARY
[de-identified] : EKG 5/12/23 Normal sinus rhythm 88 bpm, first degree AVB, LBBB, QTc 516 ms EKG 11/17/22 LBBB HR 70bpm  EKG 07/28/22 LBBB NSR Hr 88  EKG 4/28/22: Normal sinus rhythm, LAD, LBBB -- new compared to 2015 EKG   [de-identified] : 5/3/22 TTE EF 33 % Mod to sever decreased LV syst function, Mod MVR, Mild TR

## 2023-09-07 ENCOUNTER — APPOINTMENT (OUTPATIENT)
Dept: ORTHOPEDIC SURGERY | Facility: CLINIC | Age: 73
End: 2023-09-07
Payer: MEDICARE

## 2023-09-07 PROCEDURE — 99213 OFFICE O/P EST LOW 20 MIN: CPT

## 2023-09-08 NOTE — IMAGING
[de-identified] :  pleasant he is examined in some stress ambulates slowly with a limp to the right mild limits in knee motion diffuse tenderness   no instability\par   x-rays from the hospital mild moderate arthritis

## 2023-09-08 NOTE — REASON FOR VISIT
[Family Member] : family member [FreeTextEntry2] : lower back and bilateral knee Still using the cane here with her daughter 10 visits to therapy doing home TENS unit feels about 20% better off the walker still some tightness in the right thigh

## 2023-09-08 NOTE — ASSESSMENT
[FreeTextEntry1] :  recommended heat light exercise continue therapy Advil would be good no reason for an injection MRI today I think the sciatica will slowly improve return in 6 weeks   suggested a cane

## 2023-09-27 ENCOUNTER — NON-APPOINTMENT (OUTPATIENT)
Age: 73
End: 2023-09-27

## 2023-09-27 DIAGNOSIS — Z82.49 FAMILY HISTORY OF ISCHEMIC HEART DISEASE AND OTHER DISEASES OF THE CIRCULATORY SYSTEM: ICD-10-CM

## 2023-09-27 DIAGNOSIS — Z83.6 FAMILY HISTORY OF OTHER DISEASES OF THE RESPIRATORY SYSTEM: ICD-10-CM

## 2023-09-27 DIAGNOSIS — Z87.39 PERSONAL HISTORY OF OTHER DISEASES OF THE MUSCULOSKELETAL SYSTEM AND CONNECTIVE TISSUE: ICD-10-CM

## 2023-09-27 DIAGNOSIS — Z87.19 PERSONAL HISTORY OF OTHER DISEASES OF THE DIGESTIVE SYSTEM: ICD-10-CM

## 2023-09-27 DIAGNOSIS — M54.81 OCCIPITAL NEURALGIA: ICD-10-CM

## 2023-09-27 DIAGNOSIS — G40.109 LOCALIZATION-RELATED (FOCAL) (PARTIAL) SYMPTOMATIC EPILEPSY AND EPILEPTIC SYNDROMES WITH SIMPLE PARTIAL SEIZURES, NOT INTRACTABLE, W/OUT STATUS EPILEPTICUS: ICD-10-CM

## 2023-10-23 NOTE — ED ADULT TRIAGE NOTE - INTERNATIONAL TRAVEL
Chart reviewed in absence of PCP. Pt seen in ER for further evaluation per RN below. Will leave for PCP as FYI.  Galina Ulloa PA-C     No

## 2023-11-06 ENCOUNTER — APPOINTMENT (OUTPATIENT)
Dept: CARDIOLOGY | Facility: CLINIC | Age: 73
End: 2023-11-06
Payer: MEDICARE

## 2023-11-06 PROCEDURE — 93306 TTE W/DOPPLER COMPLETE: CPT

## 2023-11-09 ENCOUNTER — APPOINTMENT (OUTPATIENT)
Dept: ORTHOPEDIC SURGERY | Facility: CLINIC | Age: 73
End: 2023-11-09
Payer: MEDICARE

## 2023-11-09 DIAGNOSIS — S86.911A STRAIN OF UNSPECIFIED MUSCLE(S) AND TENDON(S) AT LOWER LEG LEVEL, RIGHT LEG, INITIAL ENCOUNTER: ICD-10-CM

## 2023-11-09 DIAGNOSIS — M54.16 RADICULOPATHY, LUMBAR REGION: ICD-10-CM

## 2023-11-09 DIAGNOSIS — S76.311A STRAIN OF MUSCLE, FASCIA AND TENDON OF THE POSTERIOR MUSCLE GROUP AT THIGH LEVEL, RIGHT THIGH, INITIAL ENCOUNTER: ICD-10-CM

## 2023-11-09 PROCEDURE — 99213 OFFICE O/P EST LOW 20 MIN: CPT

## 2023-11-13 ENCOUNTER — APPOINTMENT (OUTPATIENT)
Dept: CARDIOLOGY | Facility: CLINIC | Age: 73
End: 2023-11-13
Payer: MEDICARE

## 2023-11-13 VITALS — BODY MASS INDEX: 30.17 KG/M2 | HEIGHT: 67 IN | WEIGHT: 192.19 LBS

## 2023-11-13 VITALS — DIASTOLIC BLOOD PRESSURE: 80 MMHG | HEART RATE: 76 BPM | SYSTOLIC BLOOD PRESSURE: 130 MMHG | OXYGEN SATURATION: 98 %

## 2023-11-13 DIAGNOSIS — Z71.89 OTHER SPECIFIED COUNSELING: ICD-10-CM

## 2023-11-13 DIAGNOSIS — I50.9 HEART FAILURE, UNSPECIFIED: ICD-10-CM

## 2023-11-13 DIAGNOSIS — E78.5 HYPERLIPIDEMIA, UNSPECIFIED: ICD-10-CM

## 2023-11-13 PROCEDURE — 93000 ELECTROCARDIOGRAM COMPLETE: CPT

## 2023-11-13 PROCEDURE — 99214 OFFICE O/P EST MOD 30 MIN: CPT | Mod: 25

## 2023-11-13 RX ORDER — CARVEDILOL 6.25 MG/1
6.25 TABLET, FILM COATED ORAL TWICE DAILY
Qty: 180 | Refills: 3 | Status: ACTIVE | COMMUNITY
Start: 2023-02-06 | End: 1900-01-01

## 2023-11-14 PROBLEM — I50.9 CHF (CONGESTIVE HEART FAILURE): Status: ACTIVE | Noted: 2022-07-28

## 2023-11-14 PROBLEM — Z71.89 COUNSELING ON HEALTH PROMOTION AND DISEASE PREVENTION: Status: ACTIVE | Noted: 2022-04-28

## 2023-11-14 PROBLEM — E78.5 DYSLIPIDEMIA: Status: ACTIVE | Noted: 2022-04-28

## 2023-12-08 ENCOUNTER — APPOINTMENT (OUTPATIENT)
Dept: NEUROLOGY | Facility: CLINIC | Age: 73
End: 2023-12-08

## 2024-02-28 ENCOUNTER — APPOINTMENT (OUTPATIENT)
Dept: NEUROLOGY | Facility: CLINIC | Age: 74
End: 2024-02-28

## 2024-03-08 ENCOUNTER — OUTPATIENT (OUTPATIENT)
Dept: OUTPATIENT SERVICES | Facility: HOSPITAL | Age: 74
LOS: 1 days | End: 2024-03-08
Payer: MEDICARE

## 2024-03-08 ENCOUNTER — APPOINTMENT (OUTPATIENT)
Dept: HEMATOLOGY ONCOLOGY | Facility: CLINIC | Age: 74
End: 2024-03-08
Payer: MEDICARE

## 2024-03-08 VITALS
HEIGHT: 66 IN | SYSTOLIC BLOOD PRESSURE: 140 MMHG | BODY MASS INDEX: 30.53 KG/M2 | HEART RATE: 89 BPM | DIASTOLIC BLOOD PRESSURE: 87 MMHG | OXYGEN SATURATION: 100 % | WEIGHT: 190 LBS | RESPIRATION RATE: 14 BRPM | TEMPERATURE: 98.1 F

## 2024-03-08 DIAGNOSIS — D64.9 ANEMIA, UNSPECIFIED: ICD-10-CM

## 2024-03-08 DIAGNOSIS — Z98.890 OTHER SPECIFIED POSTPROCEDURAL STATES: Chronic | ICD-10-CM

## 2024-03-08 DIAGNOSIS — Z90.89 ACQUIRED ABSENCE OF OTHER ORGANS: Chronic | ICD-10-CM

## 2024-03-08 DIAGNOSIS — Z90.49 ACQUIRED ABSENCE OF OTHER SPECIFIED PARTS OF DIGESTIVE TRACT: Chronic | ICD-10-CM

## 2024-03-08 LAB
BASOPHILS # BLD AUTO: 0.05 K/UL
BASOPHILS NFR BLD AUTO: 0.9 %
EOSINOPHIL # BLD AUTO: 0.17 K/UL
EOSINOPHIL NFR BLD AUTO: 3 %
HCT VFR BLD CALC: 39.5 %
HGB BLD-MCNC: 12.2 G/DL
IMM GRANULOCYTES NFR BLD AUTO: 0.2 %
LYMPHOCYTES # BLD AUTO: 2.32 K/UL
LYMPHOCYTES NFR BLD AUTO: 40.8 %
MAN DIFF?: NORMAL
MCHC RBC-ENTMCNC: 25.7 PG
MCHC RBC-ENTMCNC: 30.9 G/DL
MCV RBC AUTO: 83.3 FL
MONOCYTES # BLD AUTO: 0.42 K/UL
MONOCYTES NFR BLD AUTO: 7.4 %
NEUTROPHILS # BLD AUTO: 2.72 K/UL
NEUTROPHILS NFR BLD AUTO: 47.7 %
PLATELET # BLD AUTO: 241 K/UL
PMV BLD AUTO: 0 /100 WBCS
RBC # BLD: 4.72 M/UL
RBC # BLD: 4.74 M/UL
RBC # FLD: 14 %
RETICS # AUTO: 1.5 %
RETICS AGGREG/RBC NFR: 68.4 K/UL
WBC # FLD AUTO: 5.69 K/UL

## 2024-03-08 PROCEDURE — 85045 AUTOMATED RETICULOCYTE COUNT: CPT

## 2024-03-08 PROCEDURE — 99204 OFFICE O/P NEW MOD 45 MIN: CPT

## 2024-03-08 PROCEDURE — 83521 IG LIGHT CHAINS FREE EACH: CPT

## 2024-03-08 PROCEDURE — 83615 LACTATE (LD) (LDH) ENZYME: CPT

## 2024-03-08 PROCEDURE — 82607 VITAMIN B-12: CPT

## 2024-03-08 PROCEDURE — 86334 IMMUNOFIX E-PHORESIS SERUM: CPT

## 2024-03-08 PROCEDURE — 83020 HEMOGLOBIN ELECTROPHORESIS: CPT

## 2024-03-08 PROCEDURE — 82784 ASSAY IGA/IGD/IGG/IGM EACH: CPT

## 2024-03-08 PROCEDURE — 85027 COMPLETE CBC AUTOMATED: CPT

## 2024-03-08 PROCEDURE — 82728 ASSAY OF FERRITIN: CPT

## 2024-03-08 PROCEDURE — 84165 PROTEIN E-PHORESIS SERUM: CPT

## 2024-03-08 PROCEDURE — 83020 HEMOGLOBIN ELECTROPHORESIS: CPT | Mod: 26

## 2024-03-08 PROCEDURE — 83921 ORGANIC ACID SINGLE QUANT: CPT

## 2024-03-08 RX ORDER — VALSARTAN 160 MG/1
160 TABLET, COATED ORAL
Refills: 0 | Status: ACTIVE | COMMUNITY

## 2024-03-08 RX ORDER — CARVEDILOL 12.5 MG/1
12.5 TABLET, FILM COATED ORAL
Refills: 0 | Status: ACTIVE | COMMUNITY

## 2024-03-09 DIAGNOSIS — D64.9 ANEMIA, UNSPECIFIED: ICD-10-CM

## 2024-03-09 LAB
FERRITIN SERPL-MCNC: 409 NG/ML
LDH SERPL-CCNC: 211
VIT B12 SERPL-MCNC: 413 PG/ML

## 2024-03-10 PROBLEM — Z71.82 EXERCISE COUNSELING: Status: ACTIVE | Noted: 2022-04-28

## 2024-03-11 LAB
HGB A MFR BLD: 97.6 %
HGB A2 MFR BLD: 2.4 %
HGB FRACT BLD-IMP: NORMAL

## 2024-03-13 LAB
ALBUMIN MFR SERPL ELPH: 61.4 %
ALBUMIN SERPL-MCNC: 5 G/DL
ALBUMIN/GLOB SERPL: 1.6 RATIO
ALPHA1 GLOB MFR SERPL ELPH: 4.6 %
ALPHA1 GLOB SERPL ELPH-MCNC: 0.4 G/DL
ALPHA2 GLOB MFR SERPL ELPH: 8 %
ALPHA2 GLOB SERPL ELPH-MCNC: 0.7 G/DL
B-GLOBULIN MFR SERPL ELPH: 11.3 %
B-GLOBULIN SERPL ELPH-MCNC: 0.9 G/DL
DEPRECATED KAPPA LC FREE/LAMBDA SER: 0.91 RATIO
GAMMA GLOB FLD ELPH-MCNC: 1.2 G/DL
GAMMA GLOB MFR SERPL ELPH: 14.7 %
IGA SER QL IEP: 187 MG/DL
IGG SER QL IEP: 1073 MG/DL
IGM SER QL IEP: 122 MG/DL
INTERPRETATION SERPL IEP-IMP: NORMAL
KAPPA LC CSF-MCNC: 3.21 MG/DL
KAPPA LC SERPL-MCNC: 2.92 MG/DL
M PROTEIN SPEC IFE-MCNC: NORMAL
PROT SERPL-MCNC: 8.2 G/DL
PROT SERPL-MCNC: 8.2 G/DL

## 2024-03-16 LAB — METHYLMALONATE SERPL-SCNC: 177 NMOL/L

## 2024-03-17 NOTE — ASSESSMENT
[FreeTextEntry1] : 73 year old female of [place] and  heritage with mild normocytic anemia , totally asymptomatic Plan ; anemia work up , Hg electropheresis , myeloma panel            All questions and concerns addressed.

## 2024-03-17 NOTE — HISTORY OF PRESENT ILLNESS
[de-identified] : -73 year old female referred by Dr Russell for anemia . PMH of hypertension, diverticulosis/itis , asthma , sinusitis , non celiac Gluten sensitivity , iron deficiency anemia on and off iron  , both po and  IM injections , Thalassemia trait ? no prior transfusions or GI bleeding . MOst recent blood work from Jan 2024  shows Hgb :10.7 Ht : 35 MCV : 84 normal wbc and platelets  normal chemistry except elevated alkaline phosphatase , normal thyroid function,  Previous CBC in June 2023 showed Hgb :11.9  last EGD and colonoscopy 4 years ago . mammogram in Jan 2024 Social History : retired from [employer], currently an [employer]  FH : sister with breast cancer at 67  Very active , swims, bikes and bowls regularly

## 2024-04-11 ENCOUNTER — APPOINTMENT (OUTPATIENT)
Dept: ORTHOPEDIC SURGERY | Facility: CLINIC | Age: 74
End: 2024-04-11

## 2024-05-01 NOTE — ED ADULT NURSE NOTE - PAIN RATING/NUMBER SCALE (0-10): ACTIVITY
Calling pt in regards to scheduling surgery.  Informed pt that I have 05/16/2024 available at [facility] with Dr. Davenport.  Pt verbalized understanding and in agreement with date and location.  All questions answered.   Encouraged pt to call or [facility] message office with any other questions or concerns.   
0

## 2024-05-02 ENCOUNTER — LABORATORY RESULT (OUTPATIENT)
Age: 74
End: 2024-05-02

## 2024-05-02 ENCOUNTER — OUTPATIENT (OUTPATIENT)
Dept: OUTPATIENT SERVICES | Facility: HOSPITAL | Age: 74
LOS: 1 days | End: 2024-05-02
Payer: MEDICARE

## 2024-05-02 ENCOUNTER — APPOINTMENT (OUTPATIENT)
Age: 74
End: 2024-05-02
Payer: MEDICARE

## 2024-05-02 VITALS
SYSTOLIC BLOOD PRESSURE: 128 MMHG | HEART RATE: 83 BPM | HEIGHT: 66 IN | WEIGHT: 198 LBS | BODY MASS INDEX: 31.82 KG/M2 | DIASTOLIC BLOOD PRESSURE: 85 MMHG | RESPIRATION RATE: 16 BRPM | OXYGEN SATURATION: 100 %

## 2024-05-02 DIAGNOSIS — D64.9 ANEMIA, UNSPECIFIED: ICD-10-CM

## 2024-05-02 DIAGNOSIS — Z90.89 ACQUIRED ABSENCE OF OTHER ORGANS: Chronic | ICD-10-CM

## 2024-05-02 DIAGNOSIS — Z90.49 ACQUIRED ABSENCE OF OTHER SPECIFIED PARTS OF DIGESTIVE TRACT: Chronic | ICD-10-CM

## 2024-05-02 PROCEDURE — 85027 COMPLETE CBC AUTOMATED: CPT

## 2024-05-02 PROCEDURE — 81364 HBB FULL GENE SEQUENCE: CPT

## 2024-05-02 PROCEDURE — 99213 OFFICE O/P EST LOW 20 MIN: CPT

## 2024-05-02 PROCEDURE — 81259 HBA1/HBA2 FULL GENE SEQUENCE: CPT

## 2024-05-03 DIAGNOSIS — D64.9 ANEMIA, UNSPECIFIED: ICD-10-CM

## 2024-05-23 LAB
ALBUMIN SERPL ELPH-MCNC: 4.6 G/DL
ALP BLD-CCNC: 139 U/L
ALT SERPL-CCNC: 11 U/L
ANION GAP SERPL CALC-SCNC: 11 MMOL/L
AST SERPL-CCNC: 16 U/L
BILIRUB SERPL-MCNC: 0.8 MG/DL
BUN SERPL-MCNC: 16 MG/DL
CALCIUM SERPL-MCNC: 10.3 MG/DL
CHLORIDE SERPL-SCNC: 108 MMOL/L
CHOLEST SERPL-MCNC: 144 MG/DL
CO2 SERPL-SCNC: 27 MMOL/L
CREAT SERPL-MCNC: 0.9 MG/DL
EGFR: 68 ML/MIN/1.73M2
ESTIMATED AVERAGE GLUCOSE: 126 MG/DL
GLUCOSE SERPL-MCNC: 93 MG/DL
HBA1C MFR BLD HPLC: 6 %
HCT VFR BLD CALC: 39.5 %
HDLC SERPL-MCNC: 38 MG/DL
HGB BLD-MCNC: 11.8 G/DL
LDLC SERPL CALC-MCNC: 68 MG/DL
MAGNESIUM SERPL-MCNC: 2 MG/DL
MCHC RBC-ENTMCNC: 25.1 PG
MCHC RBC-ENTMCNC: 29.9 G/DL
MCV RBC AUTO: 83.9 FL
NONHDLC SERPL-MCNC: 106 MG/DL
NT-PROBNP SERPL-MCNC: 110 PG/ML
PLATELET # BLD AUTO: 219 K/UL
PMV BLD AUTO: 0 /100 WBCS
PMV BLD: 10.8 FL
POTASSIUM SERPL-SCNC: 4.9 MMOL/L
PROT SERPL-MCNC: 7 G/DL
RBC # BLD: 4.71 M/UL
RBC # FLD: 14.2 %
SODIUM SERPL-SCNC: 146 MMOL/L
TRIGL SERPL-MCNC: 190 MG/DL
WBC # FLD AUTO: 6.31 K/UL

## 2024-06-17 LAB
BETA-THALASSEMIA: NORMAL
HBA1 GENE MUT ANL BLD/T: ABNORMAL
HCT VFR BLD CALC: 35.7 %
HGB BLD-MCNC: 11.3 G/DL
MCHC RBC-ENTMCNC: 25.7 PG
MCHC RBC-ENTMCNC: 31.7 G/DL
MCV RBC AUTO: 81.3 FL
PLATELET # BLD AUTO: 198 K/UL
PMV BLD: 10.9 FL
RBC # BLD: 4.39 M/UL
RBC # FLD: 14 %
WBC # FLD AUTO: 6.13 K/UL

## 2024-06-17 RX ORDER — VALSARTAN 160 MG/1
160 TABLET, COATED ORAL DAILY
Qty: 90 | Refills: 3 | Status: ACTIVE | COMMUNITY
Start: 2022-10-06 | End: 1900-01-01

## 2024-06-17 RX ORDER — ATORVASTATIN CALCIUM 10 MG/1
10 TABLET, FILM COATED ORAL
Qty: 90 | Refills: 3 | Status: ACTIVE | COMMUNITY
Start: 1900-01-01 | End: 1900-01-01

## 2024-06-17 RX ORDER — SPIRONOLACTONE 25 MG/1
25 TABLET ORAL DAILY
Qty: 90 | Refills: 3 | Status: ACTIVE | COMMUNITY
Start: 2022-11-04 | End: 1900-01-01

## 2024-06-17 RX ORDER — ATORVASTATIN CALCIUM 10 MG/1
10 TABLET, FILM COATED ORAL
Qty: 90 | Refills: 3 | Status: DISCONTINUED | COMMUNITY
End: 2024-06-17

## 2024-07-12 ENCOUNTER — APPOINTMENT (OUTPATIENT)
Dept: CARDIOLOGY | Facility: CLINIC | Age: 74
End: 2024-07-12
Payer: MEDICARE

## 2024-07-12 VITALS
HEART RATE: 80 BPM | DIASTOLIC BLOOD PRESSURE: 78 MMHG | WEIGHT: 196 LBS | BODY MASS INDEX: 31.5 KG/M2 | HEIGHT: 66 IN | SYSTOLIC BLOOD PRESSURE: 120 MMHG

## 2024-07-12 DIAGNOSIS — I44.7 LEFT BUNDLE-BRANCH BLOCK, UNSPECIFIED: ICD-10-CM

## 2024-07-12 DIAGNOSIS — I50.9 HEART FAILURE, UNSPECIFIED: ICD-10-CM

## 2024-07-12 DIAGNOSIS — Z71.89 OTHER SPECIFIED COUNSELING: ICD-10-CM

## 2024-07-12 DIAGNOSIS — R73.03 PREDIABETES.: ICD-10-CM

## 2024-07-12 DIAGNOSIS — E78.5 HYPERLIPIDEMIA, UNSPECIFIED: ICD-10-CM

## 2024-07-12 DIAGNOSIS — I10 ESSENTIAL (PRIMARY) HYPERTENSION: ICD-10-CM

## 2024-07-12 PROCEDURE — 99214 OFFICE O/P EST MOD 30 MIN: CPT | Mod: 25

## 2024-07-12 PROCEDURE — 93000 ELECTROCARDIOGRAM COMPLETE: CPT

## 2024-07-12 RX ORDER — DAPAGLIFLOZIN 10 MG/1
10 TABLET, FILM COATED ORAL DAILY
Qty: 90 | Refills: 3 | Status: ACTIVE | COMMUNITY
Start: 2024-07-12 | End: 1900-01-01

## 2024-07-16 ENCOUNTER — APPOINTMENT (OUTPATIENT)
Age: 74
End: 2024-07-16

## 2024-07-16 ENCOUNTER — LABORATORY RESULT (OUTPATIENT)
Age: 74
End: 2024-07-16

## 2024-07-16 VITALS
SYSTOLIC BLOOD PRESSURE: 114 MMHG | RESPIRATION RATE: 16 BRPM | WEIGHT: 195 LBS | TEMPERATURE: 97.9 F | HEIGHT: 66 IN | OXYGEN SATURATION: 100 % | DIASTOLIC BLOOD PRESSURE: 78 MMHG | HEART RATE: 58 BPM | BODY MASS INDEX: 31.34 KG/M2

## 2024-07-16 DIAGNOSIS — D56.3 THALASSEMIA MINOR: ICD-10-CM

## 2024-07-16 DIAGNOSIS — R79.89 OTHER SPECIFIED ABNORMAL FINDINGS OF BLOOD CHEMISTRY: ICD-10-CM

## 2024-07-16 DIAGNOSIS — D64.9 ANEMIA, UNSPECIFIED: ICD-10-CM

## 2024-07-16 LAB
HCT VFR BLD CALC: 39.7 %
HGB BLD-MCNC: 12.4 G/DL
MCHC RBC-ENTMCNC: 25.7 PG
MCHC RBC-ENTMCNC: 31.2 G/DL
MCV RBC AUTO: 82.2 FL
PLATELET # BLD AUTO: 228 K/UL
PMV BLD: 10.5 FL
RBC # BLD: 4.83 M/UL
RBC # FLD: 14.3 %
WBC # FLD AUTO: 5.98 K/UL

## 2024-07-16 PROCEDURE — 99213 OFFICE O/P EST LOW 20 MIN: CPT

## 2024-07-16 PROCEDURE — G0452: CPT | Mod: 26

## 2024-07-17 LAB
ANION GAP SERPL CALC-SCNC: 14 MMOL/L
BUN SERPL-MCNC: 25 MG/DL
CALCIUM SERPL-MCNC: 10 MG/DL
CHLORIDE SERPL-SCNC: 104 MMOL/L
CO2 SERPL-SCNC: 21 MMOL/L
CREAT SERPL-MCNC: 1.1 MG/DL
EGFR: 53 ML/MIN/1.73M2
FERRITIN SERPL-MCNC: 441 NG/ML
GLUCOSE SERPL-MCNC: 96 MG/DL
POTASSIUM SERPL-SCNC: 5.5 MMOL/L
SODIUM SERPL-SCNC: 139 MMOL/L

## 2024-07-19 ENCOUNTER — APPOINTMENT (OUTPATIENT)
Dept: PLASTIC SURGERY | Facility: CLINIC | Age: 74
End: 2024-07-19
Payer: MEDICARE

## 2024-07-19 VITALS — BODY MASS INDEX: 30.61 KG/M2 | WEIGHT: 195 LBS | HEIGHT: 67 IN

## 2024-07-19 DIAGNOSIS — D17.9 BENIGN LIPOMATOUS NEOPLASM, UNSPECIFIED: ICD-10-CM

## 2024-07-19 PROCEDURE — 99203 OFFICE O/P NEW LOW 30 MIN: CPT

## 2024-07-22 ENCOUNTER — APPOINTMENT (OUTPATIENT)
Dept: ORTHOPEDIC SURGERY | Facility: CLINIC | Age: 74
End: 2024-07-22
Payer: MEDICARE

## 2024-07-22 DIAGNOSIS — M19.011 PRIMARY OSTEOARTHRITIS, RIGHT SHOULDER: ICD-10-CM

## 2024-07-22 PROCEDURE — 73030 X-RAY EXAM OF SHOULDER: CPT | Mod: RT

## 2024-07-22 PROCEDURE — 99213 OFFICE O/P EST LOW 20 MIN: CPT

## 2024-07-22 NOTE — HISTORY OF PRESENT ILLNESS
[de-identified] : 73-year-old female comes in today with her daughter for an evaluation of her right shoulder.  She has been having difficulty with the shoulder since an injury in 2003 while she was working for the [employer].  She states that recently she lifted a heavy box which aggravated the right shoulder.  She has been under the care of Dr. Martin for her shoulder, knees and back in the past.  She states she is in physical therapy for the shoulder in the past for treatment.  She notes history of torn rotator cuff in the shoulder treated conservatively.

## 2024-07-22 NOTE — IMAGING
[de-identified] : On examination of the right shoulder no ecchymosis, no erythema.  Skin is intact.  Active forward flexion and abduction to approximately 140 degrees although with the pain.  Pain passively ranging her further.  She has a negative drop arm although weakness is noted to rotator cuff resistance compared to the left shoulder.  X-ray right shoulder today no fracture or dislocation AC joint osteoarthritis

## 2024-07-22 NOTE — ASSESSMENT
[FreeTextEntry1] : We went over all options.  Recommending physical therapy.  She states that she has an allergy to cortisone.  She will schedule follow-up appointment with Dr. Martin in a few months, we discussed that if the pain worsens or continues could consider updated further imaging of the shoulder.

## 2024-07-23 LAB — TM INTERPRETATION: NORMAL

## 2024-08-01 ENCOUNTER — NON-APPOINTMENT (OUTPATIENT)
Age: 74
End: 2024-08-01

## 2024-08-19 ENCOUNTER — APPOINTMENT (OUTPATIENT)
Dept: CARDIOLOGY | Facility: CLINIC | Age: 74
End: 2024-08-19
Payer: MEDICARE

## 2024-08-19 VITALS
SYSTOLIC BLOOD PRESSURE: 110 MMHG | BODY MASS INDEX: 30.29 KG/M2 | HEIGHT: 67 IN | DIASTOLIC BLOOD PRESSURE: 68 MMHG | HEART RATE: 101 BPM | WEIGHT: 193 LBS

## 2024-08-19 DIAGNOSIS — I10 ESSENTIAL (PRIMARY) HYPERTENSION: ICD-10-CM

## 2024-08-19 DIAGNOSIS — Z71.89 OTHER SPECIFIED COUNSELING: ICD-10-CM

## 2024-08-19 DIAGNOSIS — I50.9 HEART FAILURE, UNSPECIFIED: ICD-10-CM

## 2024-08-19 PROCEDURE — 99214 OFFICE O/P EST MOD 30 MIN: CPT

## 2024-08-19 RX ORDER — EMPAGLIFLOZIN 10 MG/1
10 TABLET, FILM COATED ORAL
Qty: 30 | Refills: 5 | Status: ACTIVE | COMMUNITY
Start: 2024-08-19 | End: 1900-01-01

## 2024-08-23 ENCOUNTER — APPOINTMENT (OUTPATIENT)
Dept: PLASTIC SURGERY | Facility: CLINIC | Age: 74
End: 2024-08-23

## 2024-08-27 NOTE — REVIEW OF SYSTEMS
[Weight Gain (___ Lbs)] : [unfilled] ~Ulb weight gain [Feeling Fatigued] : not feeling fatigued [Joint Pain] : joint pain [Negative] : Cardiovascular [Chills] : no chills [Cough] : no cough [Abdominal Pain] : no abdominal pain [Dizziness] : no dizziness
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
## 2024-08-27 NOTE — CARDIOLOGY SUMMARY
[de-identified] : EKG 7/12/24 Normal sinus [id number] bpm  first degree AVB, LBBB  ms EKG 11/13/23 Normal sinus rhythm 72 bpm, first degree AVB, LBBB, QTc 486 ms EKG 5/12/23 Normal sinus rhythm 88 bpm, first degree AVB, LBBB, QTc 516 ms EKG 11/17/22 LBBB HR 70bpm  EKG 07/28/22 LBBB NSR Hr 88  EKG 4/28/22: Normal sinus rhythm, LAD, LBBB -- new compared to 2015 EKG   [de-identified] : 5/3/22 TTE EF 33 % Mod to sever decreased LV syst function, Mod MVR, Mild TR  TTE 11/6/2023 LVEF 37%, moderately decreased LV sys function
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
## 2024-08-27 NOTE — CARDIOLOGY SUMMARY
[de-identified] : EKG 7/12/24 Normal sinus [id number] bpm  first degree AVB, LBBB  ms EKG 11/13/23 Normal sinus rhythm 72 bpm, first degree AVB, LBBB, QTc 486 ms EKG 5/12/23 Normal sinus rhythm 88 bpm, first degree AVB, LBBB, QTc 516 ms EKG 11/17/22 LBBB HR 70bpm  EKG 07/28/22 LBBB NSR Hr 88  EKG 4/28/22: Normal sinus rhythm, LAD, LBBB -- new compared to 2015 EKG   [de-identified] : 5/3/22 TTE EF 33 % Mod to sever decreased LV syst function, Mod MVR, Mild TR  TTE 11/6/2023 LVEF 37%, moderately decreased LV sys function

## 2024-08-27 NOTE — ASSESSMENT
[FreeTextEntry1] : Assessment: #Non-ischemic CMP, HFrEF with LVEF 37% - NYHA Class I, Stage B - Well compensated, asymptomatic, euvolemic - Did not tolerate low dose Entresto 24-26 mg BID, caused over-diuresis and diarrhea, she tried it for 2 months. Did not tolerate Farxiga 10mg or 5mg.  - Cardiac MRI with no LGE #LBBB #HTN - controlled #Dyslipidemia - 4/20/23 , , HDL 46, LDL 91 on atorva 10 #Pre-DM  5/21/24 proBNP 110 , , HDL 38, LDL 68 K 4.9, Cr 0.9 A1c 6% Hgb 11.8 Mg 2  Plan: - Switch to Jardiance 10mg daily for HFrEF, BMP and Mg  - Cont Spironolactone 25 mg daily, carvedilol 6.25 mg BID, Valsartan 160 mg daily (did not tolerate Entresto) - Cont ASA 81 mg, continue atorva 10 - Encouraged plant-based and Mediterranean diets, along with increased fruit, nut, vegetable, legume, and lean vegetable or animal protein (preferably fish) consumption - Engage in at least 150 minutes per week of accumulated moderate-intensity aerobic physical activity or 75 minutes per week of vigorous-intensity aerobic physical activity - F/u as scheduled

## 2024-09-10 ENCOUNTER — NON-APPOINTMENT (OUTPATIENT)
Age: 74
End: 2024-09-10

## 2024-09-13 ENCOUNTER — APPOINTMENT (OUTPATIENT)
Dept: ORTHOPEDIC SURGERY | Facility: CLINIC | Age: 74
End: 2024-09-13
Payer: MEDICARE

## 2024-09-13 DIAGNOSIS — M19.011 PRIMARY OSTEOARTHRITIS, RIGHT SHOULDER: ICD-10-CM

## 2024-09-13 DIAGNOSIS — M54.12 RADICULOPATHY, CERVICAL REGION: ICD-10-CM

## 2024-09-13 PROCEDURE — 72040 X-RAY EXAM NECK SPINE 2-3 VW: CPT

## 2024-09-13 PROCEDURE — 99213 OFFICE O/P EST LOW 20 MIN: CPT

## 2024-09-13 RX ORDER — TIZANIDINE 4 MG/1
4 TABLET ORAL
Qty: 30 | Refills: 1 | Status: ACTIVE | COMMUNITY
Start: 2024-09-13 | End: 1900-01-01

## 2024-09-13 NOTE — HISTORY OF PRESENT ILLNESS
[de-identified] : 73-year-old female comes in today with her daughter for an evaluation of her right shoulder.  She has been having difficulty with the shoulder since an injury in 2003 while she was working for the [employer].  She states that recently she lifted a heavy box which aggravated the right shoulder.  She has been under the care of Dr. Martin for her shoulder, knees and back in the past.  She states she is in physical therapy for the shoulder in the past for treatment.  She notes history of torn rotator cuff in the shoulder treated conservatively.

## 2024-09-13 NOTE — HISTORY OF PRESENT ILLNESS
[de-identified] : 73-year-old female comes in today with her daughter for an evaluation of her right shoulder.  She has been having difficulty with the shoulder since an injury in 2003 while she was working for the [employer].  She states that recently she lifted a heavy box which aggravated the right shoulder.  She has been under the care of Dr. Martin for her shoulder, knees and back in the past.  She states she is in physical therapy for the shoulder in the past for treatment.  She notes history of torn rotator cuff in the shoulder treated conservatively.

## 2024-09-13 NOTE — ASSESSMENT
[FreeTextEntry1] : We went over all options.  Recommending continuing physical therapy.  She states that she has an allergy to cortisone.  She will schedule follow-up appointment in a few months Recommended MRI cervical spine: Results are available

## 2024-09-13 NOTE — IMAGING
[de-identified] : On examination of the right shoulder no ecchymosis, no erythema.  Skin is intact.  Active forward flexion and abduction to approximately 140 degrees although with the pain.  Pain passively ranging her further.  She has a negative drop arm although weakness is noted to rotator cuff resistance compared to the left shoulder.  X-ray right shoulder today no fracture or dislocation AC joint osteoarthritis X-ray cervical spine straightening with multilevel disc disease Cervical spine guarded motion spasm some dysesthesia down to the right thumb

## 2024-09-13 NOTE — REASON FOR VISIT
[Family Member] : family member [FreeTextEntry2] : right shoulder pain 5 weeks of therapy sometimes the pain radiates down to her thumb hemoglobin A1c 6.0 has not done any better Cannot take NSAIDs or cortisone

## 2024-09-13 NOTE — IMAGING
[de-identified] : On examination of the right shoulder no ecchymosis, no erythema.  Skin is intact.  Active forward flexion and abduction to approximately 140 degrees although with the pain.  Pain passively ranging her further.  She has a negative drop arm although weakness is noted to rotator cuff resistance compared to the left shoulder.  X-ray right shoulder today no fracture or dislocation AC joint osteoarthritis X-ray cervical spine straightening with multilevel disc disease Cervical spine guarded motion spasm some dysesthesia down to the right thumb